# Patient Record
Sex: FEMALE | Race: OTHER | NOT HISPANIC OR LATINO | Employment: OTHER | URBAN - METROPOLITAN AREA
[De-identification: names, ages, dates, MRNs, and addresses within clinical notes are randomized per-mention and may not be internally consistent; named-entity substitution may affect disease eponyms.]

---

## 2017-01-01 ENCOUNTER — TELEPHONE (OUTPATIENT)
Dept: MEDICAL GROUP | Facility: PHYSICIAN GROUP | Age: 82
End: 2017-01-01

## 2017-01-01 ENCOUNTER — OFFICE VISIT (OUTPATIENT)
Dept: MEDICAL GROUP | Facility: PHYSICIAN GROUP | Age: 82
End: 2017-01-01
Payer: MEDICARE

## 2017-01-01 ENCOUNTER — HOSPITAL ENCOUNTER (OUTPATIENT)
Dept: RADIOLOGY | Facility: MEDICAL CENTER | Age: 82
End: 2017-11-13
Attending: FAMILY MEDICINE
Payer: MEDICARE

## 2017-01-01 ENCOUNTER — HOSPITAL ENCOUNTER (OUTPATIENT)
Dept: RADIOLOGY | Facility: MEDICAL CENTER | Age: 82
End: 2017-12-29
Attending: PHYSICIAN ASSISTANT
Payer: MEDICARE

## 2017-01-01 ENCOUNTER — OFFICE VISIT (OUTPATIENT)
Dept: URGENT CARE | Facility: PHYSICIAN GROUP | Age: 82
End: 2017-01-01
Payer: MEDICARE

## 2017-01-01 ENCOUNTER — HOSPITAL ENCOUNTER (OUTPATIENT)
Facility: MEDICAL CENTER | Age: 82
End: 2017-08-16
Payer: COMMERCIAL

## 2017-01-01 VITALS
RESPIRATION RATE: 16 BRPM | HEIGHT: 63 IN | OXYGEN SATURATION: 93 % | BODY MASS INDEX: 21.62 KG/M2 | SYSTOLIC BLOOD PRESSURE: 122 MMHG | TEMPERATURE: 98.6 F | WEIGHT: 122 LBS | DIASTOLIC BLOOD PRESSURE: 68 MMHG | HEART RATE: 96 BPM

## 2017-01-01 VITALS
HEIGHT: 63 IN | DIASTOLIC BLOOD PRESSURE: 70 MMHG | RESPIRATION RATE: 32 BRPM | OXYGEN SATURATION: 88 % | WEIGHT: 121 LBS | SYSTOLIC BLOOD PRESSURE: 136 MMHG | HEART RATE: 76 BPM | TEMPERATURE: 100 F | BODY MASS INDEX: 21.44 KG/M2

## 2017-01-01 VITALS
HEIGHT: 63 IN | OXYGEN SATURATION: 94 % | WEIGHT: 116.4 LBS | TEMPERATURE: 98 F | HEART RATE: 69 BPM | BODY MASS INDEX: 20.62 KG/M2 | SYSTOLIC BLOOD PRESSURE: 140 MMHG | DIASTOLIC BLOOD PRESSURE: 60 MMHG

## 2017-01-01 DIAGNOSIS — M53.3 TAIL BONE PAIN: ICD-10-CM

## 2017-01-01 DIAGNOSIS — R50.9 FEVER, UNSPECIFIED FEVER CAUSE: ICD-10-CM

## 2017-01-01 DIAGNOSIS — I10 ESSENTIAL HYPERTENSION: ICD-10-CM

## 2017-01-01 DIAGNOSIS — J11.1 INFLUENZA-LIKE ILLNESS: ICD-10-CM

## 2017-01-01 DIAGNOSIS — J10.1 INFLUENZA B: Primary | ICD-10-CM

## 2017-01-01 DIAGNOSIS — R05.8 COUGH PRODUCTIVE OF PURULENT SPUTUM: ICD-10-CM

## 2017-01-01 DIAGNOSIS — R20.0 NUMBNESS IN BOTH HANDS: ICD-10-CM

## 2017-01-01 DIAGNOSIS — S09.90XA CLOSED HEAD INJURY, INITIAL ENCOUNTER: ICD-10-CM

## 2017-01-01 LAB
ALBUMIN SERPL BCP-MCNC: 3.8 G/DL (ref 3.2–4.9)
ALBUMIN/GLOB SERPL: 1.2 G/DL
ALP SERPL-CCNC: 54 U/L (ref 30–99)
ALT SERPL-CCNC: 14 U/L (ref 2–50)
ANION GAP SERPL CALC-SCNC: 6 MMOL/L (ref 0–11.9)
AST SERPL-CCNC: 20 U/L (ref 12–45)
BILIRUB SERPL-MCNC: 1.4 MG/DL (ref 0.1–1.5)
BUN SERPL-MCNC: 25 MG/DL (ref 8–22)
CALCIUM SERPL-MCNC: 9.5 MG/DL (ref 8.5–10.5)
CHLORIDE SERPL-SCNC: 108 MMOL/L (ref 96–112)
CHOLEST SERPL-MCNC: 173 MG/DL (ref 100–199)
CO2 SERPL-SCNC: 25 MMOL/L (ref 20–33)
CREAT SERPL-MCNC: 1.02 MG/DL (ref 0.5–1.4)
FLUAV+FLUBV AG SPEC QL IA: NORMAL
GFR SERPL CREATININE-BSD FRML MDRD: 50 ML/MIN/1.73 M 2
GLOBULIN SER CALC-MCNC: 3.2 G/DL (ref 1.9–3.5)
GLUCOSE SERPL-MCNC: 115 MG/DL (ref 65–99)
HDLC SERPL-MCNC: 52 MG/DL
INT CON NEG: NORMAL
INT CON POS: NORMAL
LDLC SERPL CALC-MCNC: 88 MG/DL
POTASSIUM SERPL-SCNC: 4.5 MMOL/L (ref 3.6–5.5)
PROT SERPL-MCNC: 7 G/DL (ref 6–8.2)
SODIUM SERPL-SCNC: 139 MMOL/L (ref 135–145)
TRIGL SERPL-MCNC: 163 MG/DL (ref 0–149)

## 2017-01-01 PROCEDURE — 99213 OFFICE O/P EST LOW 20 MIN: CPT | Performed by: FAMILY MEDICINE

## 2017-01-01 PROCEDURE — 72220 X-RAY EXAM SACRUM TAILBONE: CPT

## 2017-01-01 PROCEDURE — 87804 INFLUENZA ASSAY W/OPTIC: CPT | Performed by: PHYSICIAN ASSISTANT

## 2017-01-01 PROCEDURE — 71020 DX-CHEST-2 VIEWS: CPT

## 2017-01-01 PROCEDURE — 99214 OFFICE O/P EST MOD 30 MIN: CPT | Performed by: PHYSICIAN ASSISTANT

## 2017-01-01 RX ORDER — LEVOFLOXACIN 500 MG/1
500 TABLET, FILM COATED ORAL DAILY
Qty: 7 TAB | Refills: 0 | Status: SHIPPED | OUTPATIENT
Start: 2017-01-01 | End: 2018-01-01

## 2017-01-01 RX ORDER — BENZONATATE 200 MG/1
200 CAPSULE ORAL 3 TIMES DAILY PRN
Qty: 60 CAP | Refills: 0 | Status: SHIPPED | OUTPATIENT
Start: 2017-01-01 | End: 2018-01-01

## 2017-01-01 RX ORDER — IBUPROFEN 200 MG
400 TABLET ORAL ONCE
Status: COMPLETED | OUTPATIENT
Start: 2017-01-01 | End: 2017-01-01

## 2017-01-01 RX ORDER — MULTIVIT WITH MINERALS/LUTEIN
1 TABLET ORAL DAILY
COMMUNITY

## 2017-01-01 RX ORDER — IBUPROFEN 200 MG
200 TABLET ORAL EVERY 6 HOURS PRN
COMMUNITY
End: 2018-01-01

## 2017-01-01 RX ORDER — OSELTAMIVIR PHOSPHATE 30 MG/1
30 CAPSULE ORAL EVERY 12 HOURS
Qty: 10 CAP | Refills: 0 | Status: SHIPPED | OUTPATIENT
Start: 2017-01-01 | End: 2018-01-01

## 2017-01-01 RX ADMIN — Medication 400 MG: at 10:10

## 2017-01-01 ASSESSMENT — ENCOUNTER SYMPTOMS
HEADACHES: 1
SHORTNESS OF BREATH: 0
HEMOPTYSIS: 0
MYALGIAS: 1
SORE THROAT: 0
SPUTUM PRODUCTION: 1
RHINORRHEA: 1
WHEEZING: 1
CHILLS: 1
COUGH: 1
SWEATS: 0
FEVER: 1

## 2017-01-01 ASSESSMENT — COPD QUESTIONNAIRES: COPD: 0

## 2017-01-01 ASSESSMENT — PATIENT HEALTH QUESTIONNAIRE - PHQ9: CLINICAL INTERPRETATION OF PHQ2 SCORE: 0

## 2017-01-29 ENCOUNTER — OFFICE VISIT (OUTPATIENT)
Dept: URGENT CARE | Facility: PHYSICIAN GROUP | Age: 82
End: 2017-01-29
Payer: MEDICARE

## 2017-01-29 VITALS
RESPIRATION RATE: 14 BRPM | WEIGHT: 123 LBS | TEMPERATURE: 98.9 F | HEIGHT: 63 IN | SYSTOLIC BLOOD PRESSURE: 118 MMHG | HEART RATE: 60 BPM | DIASTOLIC BLOOD PRESSURE: 70 MMHG | BODY MASS INDEX: 21.79 KG/M2 | OXYGEN SATURATION: 93 %

## 2017-01-29 DIAGNOSIS — J06.9 UPPER RESPIRATORY TRACT INFECTION, UNSPECIFIED TYPE: ICD-10-CM

## 2017-01-29 DIAGNOSIS — H92.01 OTALGIA, RIGHT EAR: ICD-10-CM

## 2017-01-29 PROCEDURE — G8484 FLU IMMUNIZE NO ADMIN: HCPCS | Performed by: PHYSICIAN ASSISTANT

## 2017-01-29 PROCEDURE — G8432 DEP SCR NOT DOC, RNG: HCPCS | Performed by: PHYSICIAN ASSISTANT

## 2017-01-29 PROCEDURE — G8419 CALC BMI OUT NRM PARAM NOF/U: HCPCS | Performed by: PHYSICIAN ASSISTANT

## 2017-01-29 PROCEDURE — 99203 OFFICE O/P NEW LOW 30 MIN: CPT | Performed by: PHYSICIAN ASSISTANT

## 2017-01-29 PROCEDURE — 1101F PT FALLS ASSESS-DOCD LE1/YR: CPT | Mod: 8P | Performed by: PHYSICIAN ASSISTANT

## 2017-01-29 PROCEDURE — 4004F PT TOBACCO SCREEN RCVD TLK: CPT | Mod: 8P | Performed by: PHYSICIAN ASSISTANT

## 2017-01-29 PROCEDURE — 4040F PNEUMOC VAC/ADMIN/RCVD: CPT | Mod: 8P | Performed by: PHYSICIAN ASSISTANT

## 2017-01-29 RX ORDER — LOSARTAN POTASSIUM AND HYDROCHLOROTHIAZIDE 12.5; 5 MG/1; MG/1
1 TABLET ORAL DAILY
COMMUNITY
End: 2018-01-01

## 2017-01-29 RX ORDER — AZITHROMYCIN 250 MG/1
TABLET, FILM COATED ORAL
Qty: 6 TAB | Refills: 0 | Status: SHIPPED | OUTPATIENT
Start: 2017-01-29 | End: 2017-01-01

## 2017-01-29 RX ORDER — BENZONATATE 100 MG/1
100 CAPSULE ORAL 3 TIMES DAILY PRN
Qty: 60 CAP | Refills: 0 | Status: SHIPPED | OUTPATIENT
Start: 2017-01-29 | End: 2017-01-01

## 2017-01-29 ASSESSMENT — ENCOUNTER SYMPTOMS
CHILLS: 0
SHORTNESS OF BREATH: 0
VOMITING: 0
NAUSEA: 0
ABDOMINAL PAIN: 0
FEVER: 0
MUSCULOSKELETAL NEGATIVE: 1
WHEEZING: 0
DIARRHEA: 0
SORE THROAT: 0
COUGH: 1
DIZZINESS: 0
SPUTUM PRODUCTION: 0

## 2017-01-29 ASSESSMENT — COPD QUESTIONNAIRES: COPD: 0

## 2017-01-29 NOTE — PROGRESS NOTES
"Subjective:      Ansley Vazquez is a 89 y.o. female who presents with Cough    Patient's daughter present in room acting as .        Cough  This is a new problem. The current episode started 1 to 4 weeks ago (1 week). The problem has been unchanged. The cough is non-productive. Associated symptoms include ear pain. Pertinent negatives include no chest pain, chills, ear congestion, fever, nasal congestion, sore throat, shortness of breath or wheezing. Nothing aggravates the symptoms. She has tried OTC cough suppressant for the symptoms. The treatment provided mild relief. There is no history of asthma, COPD or pneumonia.   Patient reports her chest hurts when she coughs. PMH includes HTN for the past 10 years, well controlled with Hyzaar.      Review of Systems   Constitutional: Negative for fever and chills.   HENT: Positive for ear pain. Negative for sore throat.    Respiratory: Positive for cough. Negative for sputum production, shortness of breath and wheezing.    Cardiovascular: Negative for chest pain.   Gastrointestinal: Negative for nausea, vomiting, abdominal pain and diarrhea.   Genitourinary: Negative.    Musculoskeletal: Negative.    Neurological: Negative for dizziness.          Objective:     /70 mmHg  Pulse 60  Temp(Src) 37.2 °C (98.9 °F)  Resp 14  Ht 1.6 m (5' 3\")  Wt 55.792 kg (123 lb)  BMI 21.79 kg/m2  SpO2 93%     Physical Exam   Constitutional: She is oriented to person, place, and time. She appears well-developed and well-nourished. No distress.   HENT:   Head: Normocephalic and atraumatic.   Right Ear: Hearing, tympanic membrane, external ear and ear canal normal.   Left Ear: Hearing, tympanic membrane, external ear and ear canal normal.   Nose: Nose normal.   Mouth/Throat: Oropharynx is clear and moist. No oropharyngeal exudate.   Eyes: Conjunctivae are normal. Pupils are equal, round, and reactive to light. Right eye exhibits no discharge. Left eye exhibits no discharge. "   Neck: Normal range of motion.   Cardiovascular: Normal rate, regular rhythm and normal heart sounds.    No murmur heard.  Pulmonary/Chest: Effort normal and breath sounds normal. No respiratory distress. She has no wheezes. She has no rales.   Musculoskeletal: Normal range of motion.   Lymphadenopathy:     She has no cervical adenopathy.   Neurological: She is alert and oriented to person, place, and time.   Skin: Skin is warm and dry. She is not diaphoretic.   Psychiatric: She has a normal mood and affect. Her behavior is normal.   Nursing note and vitals reviewed.         PMH:  has no past medical history on file.  MEDS:   Current outpatient prescriptions:   •  losartan-hydrochlorothiazide (HYZAAR) 50-12.5 MG per tablet, Take 1 Tab by mouth every day., Disp: , Rfl:   •  azithromycin (ZITHROMAX) 250 MG Tab, Take as directed, Disp: 6 Tab, Rfl: 0  •  benzonatate (TESSALON) 100 MG Cap, Take 1 Cap by mouth 3 times a day as needed for Cough., Disp: 60 Cap, Rfl: 0  ALLERGIES:   Allergies   Allergen Reactions   • Aspirin Unspecified     Bloody stools   • Penicillins Hives   • Voltaren [Diclofenac]      SURGHX: History reviewed. No pertinent past surgical history.  SOCHX:    FH: family history is not on file.       Assessment/Plan:     1. Upper respiratory tract infection, unspecified type  - azithromycin (ZITHROMAX) 250 MG Tab; Take as directed  Dispense: 6 Tab; Refill: 0   - Complete full course of antibiotics as prescribed   - benzonatate (TESSALON) 100 MG Cap; Take 1 Cap by mouth 3 times a day as needed for Cough.  Dispense: 60 Cap; Refill: 0    2. Otalgia, right ear  - No evidence of bacterial infection  - Recommend OTC ibuprofen as needed for ear pain  - Call or return to office if symptoms persist or worsen

## 2017-01-29 NOTE — MR AVS SNAPSHOT
"        Ansley SHAYNA George   2017 10:15 AM   Office Visit   MRN: [id number]    Department:  [facility]   Dept Phone:  [phone]    Description:  Female : 3/11/1920   Provider:  Rissa Lobo PA-C           Reason for Visit     Cough cough, R ear pain x1 wk      Allergies as of 2017     Allergen Noted Reactions    Aspirin 2017   Unspecified    Bloody stools    Penicillins 2017   Hives    Voltaren [Diclofenac] 2017         You were diagnosed with     Upper respiratory tract infection, unspecified type   [[id number]]         Vital Signs     Blood Pressure Pulse Temperature Respirations Height Weight    118/70 mmHg 60 37.2 °C (98.9 °F) 14 1.6 m (5' 3\") 55.792 kg (123 lb)    Body Mass Index Oxygen Saturation                21.79 kg/m2 93%          Basic Information     Date Of Birth Sex Race Ethnicity Preferred Language    3/11/1920 Female Other Non- Other      Health Maintenance     Patient has no pending health maintenance at this time      Current Immunizations     No immunizations on file.      Below and/or attached are the medications your provider expects you to take. Review all of your home medications and newly ordered medications with your provider and/or pharmacist. Follow medication instructions as directed by your provider and/or pharmacist. Please keep your medication list with you and share with your provider. Update the information when medications are discontinued, doses are changed, or new medications (including over-the-counter products) are added; and carry medication information at all times in the event of emergency situations     Allergies:  ASPIRIN - Unspecified     PENICILLINS - Hives     VOLTAREN - (reactions not documented)               Medications  Valid as of: 2017 - 10:44 AM    Generic Name Brand Name Tablet Size Instructions for use    Azithromycin (Tab) ZITHROMAX 250 MG Take as directed        Benzonatate (Cap) TESSALON 100 MG Take 1 " Cap by mouth 3 times a day as needed for Cough.        Losartan Potassium-HCTZ (Tab) HYZAAR 50-12.5 MG Take 1 Tab by mouth every day.        .                 Medicines prescribed today were sent to:     [facility]/PHARMACY #[id number] - [place] - [address]    [address] [place] [address]    Phone: [phone] Fax: [phone]    Open 24 Hours?: No      Medication refill instructions:       If your prescription bottle indicates you have medication refills left, it is not necessary to call your provider’s office. Please contact your pharmacy and they will refill your medication.    If your prescription bottle indicates you do not have any refills left, you may request refills at any time through one of the following ways: The online eRALOS3 system (except Urgent Care), by calling your provider’s office, or by asking your pharmacy to contact your provider’s office with a refill request. Medication refills are processed only during regular business hours and may not be available until the next business day. Your provider may request additional information or to have a follow-up visit with you prior to refilling your medication.   *Please Note: Medication refills are assigned a new Rx number when refilled electronically. Your pharmacy may indicate that no refills were authorized even though a new prescription for the same medication is available at the pharmacy. Please request the medicine by name with the pharmacy before contacting your provider for a refill.           eRALOS3 Access Code: [email or web address]  Expires: 2/28/2017 10:44 AM    Your email address is not on file at [facility].  Email Addresses are required for you to sign up for eRALOS3, please contact [phone] to verify your personal information and to provide your email address prior to attempting to register for eRALOS3.    [place]  [address]   [place] [address]    eRALOS3  A secure, online tool to manage  your health information     [facility] UpTo’s CogniCor Technologiest® is a secure, online tool that connects you to your personalized health information from the privacy of your home -- day or night - making it very easy for you to manage your healthcare. Once the activation process is completed, you can even access your medical information using the CogniCor Technologiest keena, which is available for free in the Apple Keena store or Google Play store.     To learn more about [email or web address], visit [email or web address]    There are two levels of access available (as shown below):   My Chart Features  [facility] Primary Care Doctor [facility]  Specialists [facility]  Urgent  Care Non-[facility] Primary Care Doctor   Email your healthcare team securely and privately 24/7 X X X    Manage appointments: schedule your next appointment; view details of past/upcoming appointments X      Request prescription refills. X      View recent personal medical records, including lab and immunizations X X X X   View health record, including health history, allergies, medications X X X X   Read reports about your outpatient visits, procedures, consult and ER notes X X X X   See your discharge summary, which is a recap of your hospital and/or ER visit that includes your diagnosis, lab results, and care plan X X  X     How to register for [email or web address]:  Once your e-mail address has been verified, follow the following steps to sign up for [email or web address].     1. Go to  [email or web address]  2. Click on the Sign Up Now box, which takes you to the New Member Sign Up page. You will need to provide the following information:  a. Enter your [email or web address].net Access Code exactly as it appears at the top of this page. (You will not need to use this code after you’ve completed the sign-up process. If you do not sign up before the expiration date, you must request a new code.)   b. Enter your date of birth.   c. Enter your home email address.   d. Click Submit, and follow the next screen’s instructions.  3. Create a  MyChart ID. This will be your MapMyFitnesst login ID and cannot be changed, so think of one that is secure and easy to remember.  4. Create a Scripted password. You can change your password at any time.  5. Enter your Password Reset Question and Answer. This can be used at a later time if you forget your password.   6. Enter your e-mail address. This allows you to receive e-mail notifications when new information is available in Scripted.  7. Click Sign Up. You can now view your health information.    For assistance activating your Scripted account, call [phone]

## 2017-03-17 ENCOUNTER — OFFICE VISIT (OUTPATIENT)
Dept: URGENT CARE | Facility: PHYSICIAN GROUP | Age: 82
End: 2017-03-17
Payer: MEDICARE

## 2017-03-17 VITALS
RESPIRATION RATE: 16 BRPM | OXYGEN SATURATION: 85 % | WEIGHT: 110 LBS | SYSTOLIC BLOOD PRESSURE: 160 MMHG | DIASTOLIC BLOOD PRESSURE: 86 MMHG | HEART RATE: 80 BPM | BODY MASS INDEX: 19.49 KG/M2 | TEMPERATURE: 100 F

## 2017-03-17 DIAGNOSIS — J01.10 ACUTE NON-RECURRENT FRONTAL SINUSITIS: Primary | ICD-10-CM

## 2017-03-17 DIAGNOSIS — R50.9 FEVER, UNSPECIFIED FEVER CAUSE: ICD-10-CM

## 2017-03-17 PROCEDURE — 1101F PT FALLS ASSESS-DOCD LE1/YR: CPT | Mod: 8P | Performed by: PHYSICIAN ASSISTANT

## 2017-03-17 PROCEDURE — G8432 DEP SCR NOT DOC, RNG: HCPCS | Performed by: PHYSICIAN ASSISTANT

## 2017-03-17 PROCEDURE — G8484 FLU IMMUNIZE NO ADMIN: HCPCS | Performed by: PHYSICIAN ASSISTANT

## 2017-03-17 PROCEDURE — 99214 OFFICE O/P EST MOD 30 MIN: CPT | Performed by: PHYSICIAN ASSISTANT

## 2017-03-17 PROCEDURE — 4004F PT TOBACCO SCREEN RCVD TLK: CPT | Mod: 8P | Performed by: PHYSICIAN ASSISTANT

## 2017-03-17 PROCEDURE — 4040F PNEUMOC VAC/ADMIN/RCVD: CPT | Mod: 8P | Performed by: PHYSICIAN ASSISTANT

## 2017-03-17 PROCEDURE — G8419 CALC BMI OUT NRM PARAM NOF/U: HCPCS | Performed by: PHYSICIAN ASSISTANT

## 2017-03-17 RX ORDER — CYCLOSPORINE 0.5 MG/ML
EMULSION OPHTHALMIC
Refills: 11 | COMMUNITY
Start: 2016-12-24

## 2017-03-17 RX ORDER — SULFAMETHOXAZOLE AND TRIMETHOPRIM 400; 80 MG/1; MG/1
1 TABLET ORAL 2 TIMES DAILY
Qty: 20 TAB | Refills: 0 | Status: SHIPPED | OUTPATIENT
Start: 2017-03-17 | End: 2017-03-27

## 2017-03-17 ASSESSMENT — ENCOUNTER SYMPTOMS
COUGH: 1
WHEEZING: 0
GASTROINTESTINAL NEGATIVE: 1
CHILLS: 0
FEVER: 0
NERVOUS/ANXIOUS: 1
SHORTNESS OF BREATH: 0
DIZZINESS: 1
EYES NEGATIVE: 1
MUSCULOSKELETAL NEGATIVE: 1
HEADACHES: 1
CARDIOVASCULAR NEGATIVE: 1

## 2017-03-17 NOTE — MR AVS SNAPSHOT
Ansley Vazquez   3/17/2017 6:00 PM   Office Visit   MRN: [id number]    Department:  [facility]   Dept Phone:  [phone]    Description:  Female : 3/11/1920   Provider:  Jarett Ortiz PA-C           Reason for Visit     Dizziness dizziness for about an hour, had cough x1 wk ago, sinus issues/pressure      Allergies as of 3/17/2017     Allergen Noted Reactions    Aspirin 2017   Unspecified    Bloody stools    Penicillins 2017   Hives    Voltaren [Diclofenac] 2017         You were diagnosed with     Fever, unspecified fever cause   [[id number]]       Sinus pressure   [[id number]]         Vital Signs     Blood Pressure Pulse Temperature Respirations Weight Oxygen Saturation    160/86 mmHg 80 37.8 °C (100 °F) 16 49.896 kg (110 lb) 85%      Basic Information     Date Of Birth Sex Race Ethnicity Preferred Language    3/11/1920 Female Other Non- English      Health Maintenance        Date Due Completion Dates    IMM DTaP/Tdap/Td Vaccine (1 - Tdap) 3/11/1939 ---    PAP SMEAR 3/11/1941 ---    MAMMOGRAM 3/11/1960 ---    COLONOSCOPY 3/11/1970 ---    IMM ZOSTER VACCINE 3/11/1980 ---    IMM PNEUMOCOCCAL 65+ (ADULT) LOW/MEDIUM RISK SERIES (1 of 2 - PCV13) 3/11/1985 ---    BONE DENSITY 2016    IMM INFLUENZA (1) 2016 ---            Current Immunizations     No immunizations on file.      Below and/or attached are the medications your provider expects you to take. Review all of your home medications and newly ordered medications with your provider and/or pharmacist. Follow medication instructions as directed by your provider and/or pharmacist. Please keep your medication list with you and share with your provider. Update the information when medications are discontinued, doses are changed, or new medications (including over-the-counter products) are added; and carry medication information at all times in the event of emergency situations     Allergies:  ASPIRIN -  Unspecified     PENICILLINS - Hives     VOLTAREN - (reactions not documented)               Medications  Valid as of: March 17, 2017 -  7:06 PM    Generic Name Brand Name Tablet Size Instructions for use    Azithromycin (Tab) ZITHROMAX 250 MG Take as directed        Benzonatate (Cap) TESSALON 100 MG Take 1 Cap by mouth 3 times a day as needed for Cough.        CycloSPORINE (Emulsion) RESTASIS 0.05 % INSTILL 1 DROP INTO BOTH EYES TWICE A DAY        Losartan Potassium-HCTZ (Tab) HYZAAR 50-12.5 MG Take 1 Tab by mouth every day.        Sulfamethoxazole-Trimethoprim (Tab) BACTRIM 400-80 MG Take 1 Tab by mouth 2 times a day for 10 days.        .                 Medicines prescribed today were sent to:     [facility]/PHARMACY #[id number] - [place], [place] - [address] [place] [address] [place] [address]    Phone: [phone] Fax: [phone]    Open 24 Hours?: No      Medication refill instructions:       If your prescription bottle indicates you have medication refills left, it is not necessary to call your provider’s office. Please contact your pharmacy and they will refill your medication.    If your prescription bottle indicates you do not have any refills left, you may request refills at any time through one of the following ways: The online etrigg system (except Urgent Care), by calling your provider’s office, or by asking your pharmacy to contact your provider’s office with a refill request. Medication refills are processed only during regular business hours and may not be available until the next business day. Your provider may request additional information or to have a follow-up visit with you prior to refilling your medication.   *Please Note: Medication refills are assigned a new Rx number when refilled electronically. Your pharmacy may indicate that no refills were authorized even though a new prescription for the same medication is available at the pharmacy. Please request the medicine  by name with the pharmacy before contacting your provider for a refill.        Your To Do List     Future Labs/Procedures Complete By Expires    URINE CULTURE(NEW)  As directed 3/17/2018      Instructions    Nasal spray: Ponaris.    Humidifier at bedtime.  Hot steam showers to loosen up mucous.  Lots of fluids, tea with honey.  Ibuprofen for headache, fever, chills.  Be sure to take with food.  Return if worsening: Yellow thicker mucus changes, worsening pain around the eyes and radiates to the teeth, and fever over 101°F.      Follow-up with PCP.          Lucinda Access Code: [email or web address]  Expires: 4/16/2017  7:06 PM    Your email address is not on file at [facility].  Email Addresses are required for you to sign up for Julep, please contact [phone] to verify your personal information and to provide your email address prior to attempting to register for Julep.    Ansley V[place]  [address]   [place] [address]    Julep  A secure, online tool to manage your health information     [facility]’s Julep® is a secure, online tool that connects you to your personalized health information from the privacy of your home -- day or night - making it very easy for you to manage your healthcare. Once the activation process is completed, you can even access your medical information using the Julep keena, which is available for free in the Apple Keena store or Google Play store.     To learn more about Julep, visit [email or web address]    There are two levels of access available (as shown below):   My Chart Features  [facility] Primary Care Doctor [facility]  Specialists [facility]  Urgent  Care Non-[facility] Primary Care Doctor   Email your healthcare team securely and privately 24/7 X X X    Manage appointments: schedule your next appointment; view details of past/upcoming appointments X      Request prescription refills. X      View recent personal medical records, including lab and immunizations X X X X   View health  record, including health history, allergies, medications X X X X   Read reports about your outpatient visits, procedures, consult and ER notes X X X X   See your discharge summary, which is a recap of your hospital and/or ER visit that includes your diagnosis, lab results, and care plan X X  X     How to register for Ground Up Biosolutions:  Once your e-mail address has been verified, follow the following steps to sign up for Ground Up Biosolutions.     1. Go to  [email or web address]  2. Click on the Sign Up Now box, which takes you to the New Member Sign Up page. You will need to provide the following information:  a. Enter your Ground Up Biosolutions Access Code exactly as it appears at the top of this page. (You will not need to use this code after you’ve completed the sign-up process. If you do not sign up before the expiration date, you must request a new code.)   b. Enter your date of birth.   c. Enter your home email address.   d. Click Submit, and follow the next screen’s instructions.  3. Create a Ground Up Biosolutions ID. This will be your [employer] Up [employer]olutions login ID and cannot be changed, so think of one that is secure and easy to remember.  4. Create a [employer]olutions password. You can change your password at any time.  5. Enter your Password Reset Question and Answer. This can be used at a later time if you forget your password.   6. Enter your e-mail address. This allows you to receive e-mail notifications when new information is available in Ground Up Biosolutions.  7. Click Sign Up. You can now view your health information.    For assistance activating your Ground Up Biosolutions account, call [phone]

## 2017-03-18 ENCOUNTER — HOSPITAL ENCOUNTER (OUTPATIENT)
Facility: MEDICAL CENTER | Age: 82
End: 2017-03-18
Attending: PHYSICIAN ASSISTANT
Payer: MEDICARE

## 2017-03-18 DIAGNOSIS — R50.9 FEVER, UNSPECIFIED FEVER CAUSE: ICD-10-CM

## 2017-03-18 PROCEDURE — 87086 URINE CULTURE/COLONY COUNT: CPT

## 2017-03-18 NOTE — PROGRESS NOTES
Subjective:      Ansley Vazquez is a 89 y.o. female who presents with Dizziness            Dizziness  Associated symptoms include congestion, coughing and headaches. Pertinent negatives include no chills or fever.     Chief Complaint   Patient presents with   • Dizziness     dizziness for about an hour, had cough x1 wk ago, sinus issues/pressure       HPI:  Ansley Vazquez is a 89 y.o. female who presents with daughter and cousin.  Dizziness started with standing today.  She does have a propensity to get nervous. After the dizziness happened and she is putting the telephone back into its cradle she Started having shakiness in the hands.  She became very nervous and had some chest pressure. No further pressure thereafter. For the past week she has had a nonproductive cough and sinus congestion. Left side of the nose is plugged.  Cough has improved. She has noticed more yellow productive thick or mucus from the nose.Nosorethroat.    Was treated for URI and ear infection late January and also symptoms resolved thereafter.      No past medical history on file.    No past surgical history on file.    No family history on file.    Social History     Social History   • Marital Status:      Spouse Name: N/A   • Number of Children: N/A   • Years of Education: N/A     Occupational History   • Not on file.     Social History Main Topics   • Smoking status: Not on file   • Smokeless tobacco: Not on file   • Alcohol Use: Not on file   • Drug Use: Not on file   • Sexual Activity: Not on file     Other Topics Concern   • Not on file     Social History Narrative   • No narrative on file         Current outpatient prescriptions:   •  RESTASIS, INSTILL 1 DROP INTO BOTH EYES TWICE A DAY  •  losartan-hydrochlorothiazide, 1 Tab, Oral, DAILY, 1/29/2017  •  azithromycin, Take as directed  •  benzonatate, 100 mg, Oral, TID PRN    Allergies   Allergen Reactions   • Aspirin Unspecified     Bloody stools   • Penicillins Hives   •  Voltaren [Diclofenac]             Review of Systems   Constitutional: Negative for fever and chills.   HENT: Positive for congestion. Negative for ear pain.    Eyes: Negative.    Respiratory: Positive for cough. Negative for shortness of breath and wheezing.    Cardiovascular: Negative.    Gastrointestinal: Negative.    Genitourinary: Negative.    Musculoskeletal: Negative.    Skin: Negative.    Neurological: Positive for dizziness and headaches.   Endo/Heme/Allergies: Negative.    Psychiatric/Behavioral: The patient is nervous/anxious.           Objective:     /86 mmHg  Pulse 80  Temp(Src) 37.8 °C (100 °F)  Resp 16  Wt 49.896 kg (110 lb)  SpO2 85%     Physical Exam       Physical Exam   Nursing note and vitals reviewed.    Constitutional:   Appropriately groomed, pleasant affect, well nourished, in NAD.    Head:   Normocephalic, atraumatic.    Eyes:   PERRLA, EOM's full, sclera white, conjunctiva not erythematous, and medial canthus without exudate bilaterally.    Ears:  Auricle and tragus non-tender to manipulation.  No pre-auricular lymphadenopathy or mastoid ttp.  EACs with mild cerumen bilaterally, not erythematous.  TM’s pearly gray with cone of light present and umbo and malleolus visible bilaterally.  No bulging or fluid bubbles present in middle ear.  Hearing grossly intact to voice.    Nose:  Nares not patent bilaterally.  Nasal mucosa edematous with yellow-white rhinorrhea bilaterally. Frontal sinuses tender to percussion bilaterally.    Throat:  Dentition wnl, mucosa moist without lesions.  Oropharynx erythematous, with no enlargement of the palatine tonsils bilaterally with no exudates.    Post nasal drainage present.  Soft palate rises symmetrically bilaterally and uvula midline.      Neck: Neck supple, with mild anterior lymphadenopathy that is soft and mobile to palpation. Thyroid non-palpable without tenderness or nodules. No supraclavicular lymphadenopathy.    Lungs:  Respiratory effort  not labored without accessory muscle use.  Lungs clear to auscultation bilaterally without wheezes or rales. Rhonchi clear to cough.    Heart:  RRR, without murmurs rubs or gallops.  Radial and dorsalis pedis pulse 2+ bilaterally.  No LE edema.    Musculoskeletal:  Gait non-antalgic with a narrow base.    Derm:  Skin without rashes or lesions with good turgor pressure.      Psychiatric:  Mood, affect, and judgement appropriate.         Assessment/Plan:     1. Fever, unspecified fever cause    - sulfamethoxazole-trimethoprim (BACTRIM) 400-80 MG Tab; Take 1 Tab by mouth 2 times a day for 10 days.  Dispense: 20 Tab; Refill: 0  - URINE CULTURE(NEW); Future    2. Acute non-recurrent frontal sinusitis  Patient presents with one week of upper respiratory infection with cough. She has noticed more yellow purulent nasal discharge with left nasal passageway congestion. Dizziness started today when she was standing and she developed shakiness thereafter. She denies any popping or clicking in the ears. No fever or chills. She does live alone. She became very nervous after the dizziness happened which seems to be induced mostly with looking down upward. Cannot rule out BPPV. Per cousin, vertigo does run in her family and both of her 2 sisters have experienced transitory vertigo at times.    On exam patient has yellow rhinorrhea bilaterally with overall dry nasal passageways. Lungs clear to auscultation bilaterally. No evidence of otitis media. Patient with elevated temperature and oxygen saturation 85% room air. After sitting for 20 minutes, repeat pulse ox improved to 94% room air.  UA with trace blood. Sent urine for culture for further evaluation. Advised the patient to increase fluids and plan to treat with low-dose Bactrim ×10 days cover both bacterial sinusitis and urinary tract infection. Did advise patient to follow with PCP next week or back at urgent care if not improving.    Patient was in agreement with this  treatment plan and seemed to understand without barriers. All questions were encouraged and answered.  Reviewed signs and symptoms of when to seek emergency medical care.     Please note that this dictation was created using voice recognition software.  I have made every reasonable attempt to correct obvious errors, but I expect there are errors of chio and possibly content that I did not discover before finalizing the note.     - sulfamethoxazole-trimethoprim (BACTRIM) 400-80 MG Tab; Take 1 Tab by mouth 2 times a day for 10 days.  Dispense: 20 Tab; Refill: 0

## 2017-03-18 NOTE — PATIENT INSTRUCTIONS
Nasal spray: Ponaris.    Humidifier at bedtime.  Hot steam showers to loosen up mucous.  Lots of fluids, tea with honey.  Ibuprofen for headache, fever, chills.  Be sure to take with food.  Return if worsening: Yellow thicker mucus changes, worsening pain around the eyes and radiates to the teeth, and fever over 101°F.      Follow-up with PCP.

## 2017-03-20 LAB
BACTERIA UR CULT: NORMAL
SIGNIFICANT IND 70042: NORMAL
SOURCE SOURCE: NORMAL

## 2017-04-08 ENCOUNTER — HOSPITAL ENCOUNTER (OUTPATIENT)
Dept: LAB | Facility: MEDICAL CENTER | Age: 82
End: 2017-04-08
Attending: INTERNAL MEDICINE
Payer: MEDICARE

## 2017-04-08 LAB
ALBUMIN SERPL BCP-MCNC: 4.2 G/DL (ref 3.2–4.9)
ALBUMIN/GLOB SERPL: 1.2 G/DL
ALP SERPL-CCNC: 63 U/L (ref 30–99)
ALT SERPL-CCNC: 19 U/L (ref 2–50)
ANION GAP SERPL CALC-SCNC: 5 MMOL/L (ref 0–11.9)
AST SERPL-CCNC: 22 U/L (ref 12–45)
BILIRUB SERPL-MCNC: 0.5 MG/DL (ref 0.1–1.5)
BUN SERPL-MCNC: 32 MG/DL (ref 8–22)
CALCIUM SERPL-MCNC: 9.4 MG/DL (ref 8.5–10.5)
CHLORIDE SERPL-SCNC: 105 MMOL/L (ref 96–112)
CO2 SERPL-SCNC: 28 MMOL/L (ref 20–33)
CREAT SERPL-MCNC: 1.13 MG/DL (ref 0.5–1.4)
ERYTHROCYTE [DISTWIDTH] IN BLOOD BY AUTOMATED COUNT: 46.1 FL (ref 35.9–50)
GFR SERPL CREATININE-BSD FRML MDRD: 45 ML/MIN/1.73 M 2
GLOBULIN SER CALC-MCNC: 3.4 G/DL (ref 1.9–3.5)
GLUCOSE SERPL-MCNC: 126 MG/DL (ref 65–99)
HCT VFR BLD AUTO: 40 % (ref 37–47)
HGB BLD-MCNC: 12.6 G/DL (ref 12–16)
MCH RBC QN AUTO: 31.4 PG (ref 27–33)
MCHC RBC AUTO-ENTMCNC: 31.5 G/DL (ref 33.6–35)
MCV RBC AUTO: 99.8 FL (ref 81.4–97.8)
PLATELET # BLD AUTO: 155 K/UL (ref 164–446)
PMV BLD AUTO: 10.5 FL (ref 9–12.9)
POTASSIUM SERPL-SCNC: 4.9 MMOL/L (ref 3.6–5.5)
PROT SERPL-MCNC: 7.6 G/DL (ref 6–8.2)
RBC # BLD AUTO: 4.01 M/UL (ref 4.2–5.4)
SODIUM SERPL-SCNC: 138 MMOL/L (ref 135–145)
WBC # BLD AUTO: 4.1 K/UL (ref 4.8–10.8)

## 2017-04-08 PROCEDURE — 85027 COMPLETE CBC AUTOMATED: CPT

## 2017-04-08 PROCEDURE — 80053 COMPREHEN METABOLIC PANEL: CPT

## 2017-04-08 PROCEDURE — 36415 COLL VENOUS BLD VENIPUNCTURE: CPT

## 2017-04-20 ENCOUNTER — HOSPITAL ENCOUNTER (OUTPATIENT)
Dept: LAB | Facility: MEDICAL CENTER | Age: 82
End: 2017-04-20
Attending: OPHTHALMOLOGY
Payer: MEDICARE

## 2017-04-20 LAB
ALBUMIN SERPL BCP-MCNC: 4.4 G/DL (ref 3.2–4.9)
ALBUMIN/GLOB SERPL: 1.3 G/DL
ALP SERPL-CCNC: 66 U/L (ref 30–99)
ALT SERPL-CCNC: 18 U/L (ref 2–50)
ANION GAP SERPL CALC-SCNC: 4 MMOL/L (ref 0–11.9)
AST SERPL-CCNC: 22 U/L (ref 12–45)
BASOPHILS # BLD AUTO: 1 % (ref 0–1.8)
BASOPHILS # BLD: 0.05 K/UL (ref 0–0.12)
BILIRUB SERPL-MCNC: 0.5 MG/DL (ref 0.1–1.5)
BUN SERPL-MCNC: 43 MG/DL (ref 8–22)
CALCIUM SERPL-MCNC: 9.8 MG/DL (ref 8.5–10.5)
CHLORIDE SERPL-SCNC: 101 MMOL/L (ref 96–112)
CO2 SERPL-SCNC: 29 MMOL/L (ref 20–33)
CREAT SERPL-MCNC: 1.45 MG/DL (ref 0.5–1.4)
CRP SERPL HS-MCNC: 0.07 MG/DL (ref 0–0.75)
EOSINOPHIL # BLD AUTO: 0.09 K/UL (ref 0–0.51)
EOSINOPHIL NFR BLD: 1.8 % (ref 0–6.9)
ERYTHROCYTE [DISTWIDTH] IN BLOOD BY AUTOMATED COUNT: 45.7 FL (ref 35.9–50)
ERYTHROCYTE [SEDIMENTATION RATE] IN BLOOD BY WESTERGREN METHOD: 21 MM/HOUR (ref 0–30)
GFR SERPL CREATININE-BSD FRML MDRD: 33 ML/MIN/1.73 M 2
GLOBULIN SER CALC-MCNC: 3.5 G/DL (ref 1.9–3.5)
GLUCOSE SERPL-MCNC: 96 MG/DL (ref 65–99)
HCT VFR BLD AUTO: 39.4 % (ref 37–47)
HGB BLD-MCNC: 12.5 G/DL (ref 12–16)
IMM GRANULOCYTES # BLD AUTO: 0.02 K/UL (ref 0–0.11)
IMM GRANULOCYTES NFR BLD AUTO: 0.4 % (ref 0–0.9)
LYMPHOCYTES # BLD AUTO: 2.62 K/UL (ref 1–4.8)
LYMPHOCYTES NFR BLD: 51.3 % (ref 22–41)
MCH RBC QN AUTO: 30.9 PG (ref 27–33)
MCHC RBC AUTO-ENTMCNC: 31.7 G/DL (ref 33.6–35)
MCV RBC AUTO: 97.5 FL (ref 81.4–97.8)
MONOCYTES # BLD AUTO: 0.35 K/UL (ref 0–0.85)
MONOCYTES NFR BLD AUTO: 6.8 % (ref 0–13.4)
NEUTROPHILS # BLD AUTO: 1.98 K/UL (ref 2–7.15)
NEUTROPHILS NFR BLD: 38.7 % (ref 44–72)
NRBC # BLD AUTO: 0 K/UL
NRBC BLD AUTO-RTO: 0 /100 WBC
PLATELET # BLD AUTO: 146 K/UL (ref 164–446)
PMV BLD AUTO: 9.4 FL (ref 9–12.9)
POTASSIUM SERPL-SCNC: 4.5 MMOL/L (ref 3.6–5.5)
PROT SERPL-MCNC: 7.9 G/DL (ref 6–8.2)
RBC # BLD AUTO: 4.04 M/UL (ref 4.2–5.4)
SODIUM SERPL-SCNC: 134 MMOL/L (ref 135–145)
WBC # BLD AUTO: 5.1 K/UL (ref 4.8–10.8)

## 2017-04-20 PROCEDURE — 86140 C-REACTIVE PROTEIN: CPT

## 2017-04-20 PROCEDURE — 36415 COLL VENOUS BLD VENIPUNCTURE: CPT

## 2017-04-20 PROCEDURE — 85652 RBC SED RATE AUTOMATED: CPT

## 2017-04-20 PROCEDURE — 85025 COMPLETE CBC W/AUTO DIFF WBC: CPT

## 2017-04-20 PROCEDURE — 80053 COMPREHEN METABOLIC PANEL: CPT

## 2017-05-09 ENCOUNTER — HOSPITAL ENCOUNTER (OUTPATIENT)
Dept: RADIOLOGY | Facility: MEDICAL CENTER | Age: 82
End: 2017-05-09
Attending: OPHTHALMOLOGY
Payer: MEDICARE

## 2017-05-09 DIAGNOSIS — R51.9 FACIAL PAIN: ICD-10-CM

## 2017-05-09 DIAGNOSIS — H57.10: ICD-10-CM

## 2017-05-09 PROCEDURE — 93880 EXTRACRANIAL BILAT STUDY: CPT

## 2017-05-12 ENCOUNTER — HOSPITAL ENCOUNTER (OUTPATIENT)
Dept: RADIOLOGY | Facility: MEDICAL CENTER | Age: 82
End: 2017-05-12
Attending: OPHTHALMOLOGY
Payer: MEDICARE

## 2017-05-12 DIAGNOSIS — M31.6 TEMPORAL ARTERITIS (HCC): ICD-10-CM

## 2017-05-12 PROCEDURE — 93880 EXTRACRANIAL BILAT STUDY: CPT

## 2017-07-24 NOTE — Clinical Note
[facility]  Vy Singh M.D.  [address]  [place] [address]  Fax: [phone]   Authorization for Release/Disclosure of   Protected Health Information   Name: ANSLEY DOMINGO : 3/11/1920 SSN: [id number]   Address: [address] [address] Phone:    [phone] (home)    I authorize the entity listed below to release/disclose the PHI below to:   [facility]/Vy Singh M.D. and Vy Singh M.D.   Provider or Entity Name:    Dr. Asher [facility]   Phone:  [phone]    Fax:  [phone]   Reason for request: continuity of care   Information to be released:    [  ] LAST COLONOSCOPY,  including any PATH REPORT and follow-up  [  ] LAST FIT/COLOGUARD RESULT [  ] LAST DEXA  [  ] LAST MAMMOGRAM  [  ] LAST PAP  [  ] LAST LABS [  ] RETINA EXAM REPORT  [  ] IMMUNIZATION RECORDS  [  ] Release all info      [  ] Check here and initial the line next to each item to release ALL health information INCLUDING  _____ Care and treatment for drug and / or alcohol abuse  _____ HIV testing, infection status, or AIDS  _____ Genetic Testing    DATES OF SERVICE OR TIME PERIOD TO BE DISCLOSED: _____________  I understand and acknowledge that:  * This Authorization may be revoked at any time by you in writing, except if your health information has already been used or disclosed.  * Your health information that will be used or disclosed as a result of you signing this authorization could be re-disclosed by the recipient. If this occurs, your re-disclosed health information may no longer be protected by State or Federal laws.  * You may refuse to sign this Authorization. Your refusal will not affect your ability to obtain treatment.  * This Authorization becomes effective upon signing and will  on (date) __________.      If no date is indicated, this Authorization will  one (1) year from the signature date.    Name: Ansley  Angelina Vazquez    Signature:   Date:     7/24/2017       PLEASE FAX REQUESTED RECORDS BACK TO: [phone]

## 2017-07-25 NOTE — PROGRESS NOTES
Subjective:      Ansley Vazquez is a 89 y.o. female who presents with [facility] and Other            Other      This is a 89-year-old [place]one[place] female who is here as a new patient establish. She is accompanied by her daughter who is a regular patient of mine. Patient was previously under the care of Dr. Asher.    Patient has hypertension which is under control on losartan/HCTZ. She has no history of diabetes or dyslipidemia.    She has been having some numbness of her hands when she wakes up in the morning. Sometimes the numbness awakens her in the middle of the night.     She lives with her daughter. She is still independent and sharp. She is still able to cook and clean the house.    I reviewed the following    Past Medical History   Diagnosis Date   • HTN (hypertension)         Past Surgical History   Procedure Laterality Date   • Arthroscopy, knee Bilateral    • Cataract extraction with iol Bilateral    • Salpingectomy       right    • Appendectomy         Allergies   Allergen Reactions   • Aspirin Unspecified     Bloody stools   • Penicillins Hives   • Voltaren [Diclofenac]        Current Outpatient Prescriptions   Medication Sig Dispense Refill   • vitamin E (VITAMIN E) 1000 UNIT Cap Take 1 Cap by mouth every day.     • vitamin D (CHOLECALCIFEROL) 1000 UNIT Tab Take 1,000 Units by mouth every day.     • Calcium Citrate-Vitamin D (CALCIUM + D PO) Take  by mouth.     • Multiple Vitamin (MULTI VITAMIN DAILY PO) Take  by mouth.     • losartan-hydrochlorothiazide (HYZAAR) 50-12.5 MG per tablet Take 1 Tab by mouth every day.     • RESTASIS 0.05 % ophthalmic emulsion INSTILL 1 DROP INTO BOTH EYES TWICE A DAY  11   • azithromycin (ZITHROMAX) 250 MG Tab Take as directed 6 Tab 0   • benzonatate (TESSALON) 100 MG Cap Take 1 Cap by mouth 3 times a day as needed for Cough. 60 Cap 0     No current facility-administered medications for this visit.        History reviewed. No pertinent family  "history.    Social History     Social History   • Marital Status:      Spouse Name: N/A   • Number of Children: N/A   • Years of Education: N/A     Occupational History   • Not on file.     Social History Main Topics   • Smoking status: Never Smoker    • Smokeless tobacco: Never Used   • Alcohol Use: No   • Drug Use: No   • Sexual Activity:     Partners: Male     Other Topics Concern   • Not on file     Social History Narrative          ROS     Review of Systems  Constitutional: Negative for fever, chills, weight loss and malaise/fatigue.   HEENT: Negative for ear pain, nosebleeds, congestion, sore throat and neck pain.    Eyes: Negative for blurred vision.   Respiratory: Negative for cough, sputum production, shortness of breath and wheezing.    Cardiovascular: Negative for chest pain, palpitations, orthopnea and leg swelling.   Gastrointestinal: Negative for heartburn, nausea, vomiting and abdominal pain.   Genitourinary: Negative for dysuria, urgency and frequency.   Musculoskeletal: Negative for myalgias, back pain and joint pain.   Skin: Negative for rash and itching.   Neurological: Negative for dizziness, tingling, tremors, sensory change, focal weakness and headaches. Positive numbness of the hands   Endo/Heme/Allergies: Does not bruise/bleed easily.   Psychiatric/Behavioral: Negative for depression, anxiety, or memory loss.     All other systems reviewed and are negative except as in HPI.         Objective:     /68 mmHg  Pulse 96  Temp(Src) 37 °C (98.6 °F)  Resp 16  Ht 1.6 m (5' 3\")  Wt 55.339 kg (122 lb)  BMI 21.62 kg/m2  SpO2 93%     Physical Exam   Constitutional: She is oriented to person, place, and time. She appears well-developed and well-nourished. No distress.   HENT:   Head: Normocephalic and atraumatic.   Right Ear: External ear normal.   Left Ear: External ear normal.   Nose: Nose normal.   Mouth/Throat: Oropharynx is clear and moist. No oropharyngeal exudate.   Eyes: " Conjunctivae and EOM are normal. Pupils are equal, round, and reactive to light. Right eye exhibits no discharge. Left eye exhibits no discharge. No scleral icterus.   Neck: Normal range of motion. Neck supple. No JVD present. No tracheal deviation present. No thyromegaly present.   Cardiovascular: Normal rate, regular rhythm and intact distal pulses.  Exam reveals no gallop and no friction rub.    No murmur heard.  Pulmonary/Chest: Effort normal and breath sounds normal. No stridor. No respiratory distress. She has no wheezes. She has no rales. She exhibits no tenderness.   Abdominal: Soft. Bowel sounds are normal. She exhibits no distension and no mass. There is no tenderness. There is no rebound and no guarding. No hernia.   Musculoskeletal: Normal range of motion. She exhibits no edema, tenderness or deformity.   Lymphadenopathy:     She has no cervical adenopathy.   Neurological: She is alert and oriented to person, place, and time. She has normal reflexes. She displays normal reflexes. No cranial nerve deficit. She exhibits normal muscle tone. Coordination normal.   Strong  both hands, no atrophy of the muscles of the hands, she has some arthritic changes of the PIP and DIP joints, intact neurovascular status, negative Tinel's and Phalen's   Skin: Skin is warm and dry. No rash noted. She is not diaphoretic. No erythema. No pallor.   Psychiatric: She has a normal mood and affect. Her behavior is normal. Judgment and thought content normal.   Nursing note and vitals reviewed.                 Assessment/Plan:     1. Essential hypertension  Controlled on her medication. I will get records from previous physician.    2. Numbness in both hands  This could be positional when she is sleeping. Negative Tinel's or Phalen's. We will try carpal tunnel splints for both hands to be worn when sleeping at night to see this would improve her symptoms.    I will follow her up in 6 months. We will get records from previous  physician. We will update her immunizations after we get her records.      Please note that this dictation was created using voice recognition software. I have worked with consultants from the vendor as well as technical experts from [employer] [facility] to optimize the interface. I have made every reasonable attempt to correct obvious errors, but I expect that there are errors of grammar and possibly content I did not discover before finalizing the note.

## 2017-11-11 NOTE — TELEPHONE ENCOUNTER
ESTABLISHED PATIENT PRE-VISIT PLANNING     Note: Patient will not be contacted if there is no indication to call.     1.  Reviewed notes from the last few office visits within the medical group: Yes    2.  If any orders were placed at last visit or intended to be done for this visit (i.e. 6 mos follow-up), do we have Results/Consult Notes?        •  Labs - Labs were not ordered at last office visit.   Note: If patient appointment is for lab review and patient did not complete labs, check with provider if OK to reschedule patient until labs completed.       •  Imaging - Imaging was not ordered at last office visit.       •  Referrals - No referrals were ordered at last office visit.    3. Is this appointment scheduled as a Hospital Follow-Up? No    4.  Immunizations were updated in Epic using WebIZ?: No WebIZ record       •  Web Iz Recommendations: UNKNOWN    5.  Patient is due for the following Health Maintenance Topics:   Health Maintenance Due   Topic Date Due   • Annual Wellness Visit  03/11/1920   • IMM DTaP/Tdap/Td Vaccine (1 - Tdap) 03/11/1939   • PAP SMEAR  03/11/1941   • MAMMOGRAM  03/11/1960   • COLONOSCOPY  03/11/1970   • IMM ZOSTER VACCINE  03/11/1980   • IMM PNEUMOCOCCAL 65+ (ADULT) LOW/MEDIUM RISK SERIES (1 of 2 - PCV13) 03/11/1985   • BONE DENSITY  02/11/2016   • IMM INFLUENZA (1) 09/01/2017       - Patient already has appointment scheduled for Immunizations: FLU, PREVNAR (PCV13) , TDAP and ZOSTAVAX (Shingles).Please ask Pt about vaccinations at appointment,      6.  Patient was NOT informed to arrive 15 min prior to their scheduled appointment and bring in their medication bottles.

## 2017-11-13 NOTE — PROGRESS NOTES
"Subjective:      Ansley Vazquez is a 89 y.o. female who presents with Pain (Tail bone) and Nevus            HPI     Patient is here brought by her daughter because she sustained a fall a week ago. She was trying to sit on the couch and she missed and fell on carpeted floor. Since then she has been having pain in her tailbone and coccyx. She denies any pain going down the legs. She denies any numbness or tingling down the legs. She denies any problems with urination or bowel movement. She has been taking over-the-counter Advil as needed with help. She has been putting ice on the area.     About 10 days ago she had her head on to the refrigerator. She said initially there was swelling of the top of the head on the left side. She has not had any problems with her mentation, balance. She said she always has headache which has been there even before the injury.    Past medical history, past surgical history, family history reviewed-no changes    Social history reviewed-no changes    Allergies reviewed-no changes    Medications reviewed-no changes    ROS     Review of systems as per history of present illness, the rest are negative.       Objective:     /60   Pulse 69   Temp 36.7 °C (98 °F)   Ht 1.6 m (5' 3\")   Wt 52.8 kg (116 lb 6.5 oz)   SpO2 94%   BMI 20.62 kg/m²      Physical Exam     Examined alert, awake, oriented, not in distress    Head-no swelling or hematoma in the scalp, no open sores or wounds, no tenderness in the area where she hit her head left parietal area, eyes-positive PERRLA, EOMs intact, no facial droop, tongue midline,  Neck-supple, no lymphadenopathy, no thyromegaly  Lungs-clear to auscultation, no rales, no wheezes  Heart-regular rate and rhythm, no murmur  Extremities-no edema, clubbing, cyanosis  Back exam-no pinpoint tenderness sacrum and coccyx, no hematoma noted in the area of the pain, no pinpoint tenderness spinous processes of the lumbar spine, no spasms of the " paravertebral muscles  Neuro exam-Motor strength 5/5 upper and lower extremities, sensation intact to light touch, negative knee-jerk reflexes due to previous knee replacement bilaterally, +2+ DTRs upper extremities, negative Nilay            Assessment/Plan:     1. Tail bone pain  We will get x-ray of the sacrum and coccyx. Advised warm compresses to the area. Change to Tylenol which is better tolerated without risk of gastritis, peptic ulcer disease especially in her advanced age. May use doughnut hole cushion/Lifesaver to sit on to avoid pressure on the elbow and/coccyx   - DX-SACRUM AND COCCYX 2+; Future    2. Closed head injury, initial encounter  No neurologic deficit. Conservative measures with Tylenol as needed for pain and warm compresses.      Please note that this dictation was created using voice recognition software. I have worked with consultants from the vendor as well as technical experts from [facility] to optimize the interface. I have made every reasonable attempt to correct obvious errors, but I expect that there are errors of grammar and possibly content I did not discover before finalizing the note.

## 2017-11-13 NOTE — TELEPHONE ENCOUNTER
VOICEMAIL  1. Caller Name: Ansley Vazquez                        Call Back Number: [phone] (home)       2. Message: Called and left patient a message to call back to get lab results. LM     3. Patient approves office to leave a detailed voicemail/MyChart message: N\A

## 2017-11-13 NOTE — TELEPHONE ENCOUNTER
----- Message from Vy Singh M.D. sent at 11/13/2017 12:49 PM PST -----  X-ray did not show any fracture of the tailbone. Continue with warm compresses and try to use doughnut hole cushion/Lifesaver when sitting on avoid pressure on the tailbone.

## 2017-12-29 NOTE — PROGRESS NOTES
Subjective:      Ansley Vazquez is a 89 y.o. female who presents with Cough (congestion, wheezing at night x 3 days)    PMH:  has a past medical history of HTN (hypertension).  MEDS:   Current Outpatient Prescriptions:   •  ibuprofen (MOTRIN) 200 MG Tab, Take 200 mg by mouth every 6 hours as needed., Disp: , Rfl:   •  oseltamivir (TAMIFLU) 30 MG Cap, Take 1 Cap by mouth every 12 hours for 5 days., Disp: 10 Cap, Rfl: 0  •  levofloxacin (LEVAQUIN) 500 MG tablet, Take 1 Tab by mouth every day., Disp: 7 Tab, Rfl: 0  •  benzonatate (TESSALON) 200 MG capsule, Take 1 Cap by mouth 3 times a day as needed for Cough., Disp: 60 Cap, Rfl: 0  •  vitamin E (VITAMIN E) 1000 UNIT Cap, Take 1 Cap by mouth every day., Disp: , Rfl:   •  vitamin D (CHOLECALCIFEROL) 1000 UNIT Tab, Take 1,000 Units by mouth every day., Disp: , Rfl:   •  Calcium Citrate-Vitamin D (CALCIUM + D PO), Take  by mouth., Disp: , Rfl:   •  Multiple Vitamin (MULTI VITAMIN DAILY PO), Take  by mouth., Disp: , Rfl:   •  RESTASIS 0.05 % ophthalmic emulsion, INSTILL 1 DROP INTO BOTH EYES TWICE A DAY, Disp: , Rfl: 11  •  losartan-hydrochlorothiazide (HYZAAR) 50-12.5 MG per tablet, Take 1 Tab by mouth every day., Disp: , Rfl:     Current Facility-Administered Medications:   •  ibuprofen (MOTRIN) tablet 400 mg, 400 mg, Oral, Once, Otilia Hay, P.A.-C.  ALLERGIES:   Allergies   Allergen Reactions   • Aspirin Unspecified     Bloody stools   • Penicillins Hives   • Voltaren [Diclofenac]      SURGHX:   Past Surgical History:   Procedure Laterality Date   • APPENDECTOMY     • ARTHROSCOPY, KNEE Bilateral    • CATARACT EXTRACTION WITH IOL Bilateral    • SALPINGECTOMY      right      SOCHX:  reports that she has never smoked. She has never used smokeless tobacco. She reports that she does not drink alcohol or use drugs.  FH: Reviewed with patient/family. Not pertinent to this complaint.          Patient brought into clinic today by her daughter for evaluation of  "fever and cough ×48 hours. Patient states she has had fever chills body aches, productive cough, but denies nausea vomiting or diarrhea.  Patient states she gets poor sleep due to the cough.    Patient did not get a flu shot, she had a severe allergic reaction to the flu shot in the past, and has been counseled not to receive any more.    Patient has been taking Advil for her fever with some improvement, but no change in cough.    Patient denies any other complaint.      Cough   This is a new problem. The current episode started in the past 7 days. The problem has been gradually worsening. The problem occurs every few minutes. The cough is productive of sputum. Associated symptoms include chills, a fever, headaches, myalgias, rhinorrhea and wheezing (when lying down). Pertinent negatives include no ear pain, hemoptysis, rash, sore throat, shortness of breath or sweats. The symptoms are aggravated by lying down (exertion). She has tried body position changes, OTC cough suppressant and rest for the symptoms. The treatment provided no relief. There is no history of asthma, COPD, emphysema or pneumonia.       Review of Systems   Constitutional: Positive for chills and fever.   HENT: Positive for congestion and rhinorrhea. Negative for ear pain and sore throat.    Respiratory: Positive for cough, sputum production and wheezing (when lying down). Negative for hemoptysis and shortness of breath.    Musculoskeletal: Positive for myalgias.   Skin: Negative for rash.   Neurological: Positive for headaches.   All other systems reviewed and are negative.         Objective:     /70   Pulse 76   Temp 37.8 °C (100 °F)   Resp (!) 32   Ht 1.6 m (5' 3\")   Wt 54.9 kg (121 lb)   SpO2 88%   BMI 21.43 kg/m²      Physical Exam   Constitutional: She is oriented to person, place, and time. She appears well-developed and well-nourished. She appears ill. No distress.   HENT:   Head: Normocephalic and atraumatic.   Mouth/Throat: " Oropharynx is clear and moist.   Eyes: Conjunctivae and EOM are normal. Pupils are equal, round, and reactive to light.   Neck: Normal range of motion. Neck supple.   Cardiovascular: Normal rate, regular rhythm and normal heart sounds.    Pulmonary/Chest: Effort normal. No respiratory distress. She has rhonchi. She has no rales.   Abdominal: Soft.   Musculoskeletal: Normal range of motion.   Lymphadenopathy:     She has no cervical adenopathy.   Neurological: She is alert and oriented to person, place, and time.   Skin: Skin is warm and dry. Capillary refill takes less than 2 seconds.   Psychiatric: She has a normal mood and affect.   Nursing note and vitals reviewed.         Flu: positive B    Xray images viewed and interpreted by me, confirmed by radiology:    Impression       1.  There is mild perihilar and lower lobe interstitial opacity likely related to viral pneumonitis. There is no focal airspace process.   Reading Provider Reading Date   Argelia Bowers M.D. Dec 29, 2017   Signing Provider Signing Date Signing Time   Argelia Bowers M.D. Dec 29, 2017 10:53 AM          Assessment/Plan:     1. Influenza B  ibuprofen (MOTRIN) 200 MG Tab    POCT Influenza A/B    DX-CHEST-2 VIEWS    ibuprofen (MOTRIN) tablet 400 mg    oseltamivir (TAMIFLU) 30 MG Cap    levofloxacin (LEVAQUIN) 500 MG tablet    benzonatate (TESSALON) 200 MG capsule   2. Cough productive of purulent sputum  ibuprofen (MOTRIN) 200 MG Tab    POCT Influenza A/B    DX-CHEST-2 VIEWS    ibuprofen (MOTRIN) tablet 400 mg    oseltamivir (TAMIFLU) 30 MG Cap    levofloxacin (LEVAQUIN) 500 MG tablet    benzonatate (TESSALON) 200 MG capsule   3. Fever, unspecified fever cause  ibuprofen (MOTRIN) 200 MG Tab    POCT Influenza A/B    DX-CHEST-2 VIEWS    ibuprofen (MOTRIN) tablet 400 mg    oseltamivir (TAMIFLU) 30 MG Cap    levofloxacin (LEVAQUIN) 500 MG tablet    benzonatate (TESSALON) 200 MG capsule       Will treat with tamiflu at a dose corrected for her  Creatinine Cl.   Rx for Levaquin based on CXR.      PT can continue OTC medications, increase fluids and rest until symptoms improve.     PT should follow up with PCP in 1-2 days for re-evaluation if symptoms have not improved.  Discussed red flags and reasons to return to UC or ED.  Pt and/or family verbalized understanding of diagnosis and follow up instructions and was offered informational handout on diagnosis.  PT discharged.

## 2017-12-29 NOTE — PATIENT INSTRUCTIONS
"Influenza Facts  Flu (influenza) is a contagious respiratory illness caused by the influenza viruses. It can cause mild to severe illness. While most healthy people recover from the flu without specific treatment and without complications, older people, young children, and people with certain health conditions are at higher risk for serious complications from the flu, including death.  CAUSES   · The flu virus is spread from person to person by respiratory droplets from coughing and sneezing.   · A person can also become infected by touching an object or surface with a virus on it and then touching their mouth, eye or nose.   · Adults may be able to infect others from 1 day before symptoms occur and up to 7 days after getting sick. So it is possible to give someone the flu even before you know you are sick and continue to infect others while you are sick.   SYMPTOMS   · Fever (usually high).   · Headache.   · Tiredness (can be extreme).   · Cough.   · Sore throat.   · Runny or stuffy nose.   · Body aches.   · Diarrhea and vomiting may also occur, particularly in children.   · These symptoms are referred to as \"flu-like symptoms\". A lot of different illnesses, including the common cold, can have similar symptoms.   DIAGNOSIS   · There are tests that can determine if you have the flu as long you are tested within the first 2 or 3 days of illness.   · A doctor's exam and additional tests may be needed to identify if you have a disease that is a complicating the flu.   RISKS AND COMPLICATIONS   Some of the complications caused by the flu include:  · Bacterial pneumonia or progressive pneumonia caused by the flu virus.   · Loss of body fluids (dehydration).   · Worsening of chronic medical conditions, such as heart failure, asthma, or diabetes.   · Sinus problems and ear infections.   HOME CARE INSTRUCTIONS   · Seek medical care early on.   · If you are at high risk from complications of the flu, consult your health-care " provider as soon as you develop flu-like symptoms. Those at high risk for complications include:   · People 65 years or older.   · People with chronic medical conditions, including diabetes.   · Pregnant women.   · Young children.   · Your caregiver may recommend use of an antiviral medication to help treat the flu.   · If you get the flu, get plenty of rest, drink a lot of liquids, and avoid using alcohol and tobacco.   · You can take over-the-counter medications to relieve the symptoms of the flu if your caregiver approves. (Never give aspirin to children or teenagers who have flu-like symptoms, particularly fever).   PREVENTION   The single best way to prevent the flu is to get a flu vaccine each fall. Other measures that can help protect against the flu are:  · Antiviral Medications   · A number of antiviral drugs are approved for use in preventing the flu. These are prescription medications, and a doctor should be consulted before they are used.   · Habits for Good Health   · Cover your nose and mouth with a tissue when you cough or sneeze, throw the tissue away after you use it.   · Wash your hands often with soap and water, especially after you cough or sneeze. If you are not near water, use an alcohol-based hand .   · Avoid people who are sick.   · If you get the flu, stay home from work or school. Avoid contact with other people so that you do not make them sick, too.   · Try not to touch your eyes, nose, or mouth as germs ore often spread this way.   IN CHILDREN, EMERGENCY WARNING SIGNS THAT NEED URGENT MEDICAL ATTENTION:  · Fast breathing or trouble breathing.   · Bluish skin color.   · Not drinking enough fluids.   · Not waking up or not interacting.   · Being so irritable that the child does not want to be held.   · Flu-like symptoms improve but then return with fever and worse cough.   · Fever with a rash.   IN ADULTS, EMERGENCY WARNING SIGNS THAT NEED URGENT MEDICAL ATTENTION:  · Difficulty  breathing or shortness of breath.   · Pain or pressure in the chest or abdomen.   · Sudden dizziness.   · Confusion.   · Severe or persistent vomiting.   SEEK IMMEDIATE MEDICAL CARE IF:   You or someone you know is experiencing any of the symptoms above. When you arrive at the emergency center,report that you think you have the flu. You may be asked to wear a mask and/or sit in a secluded area to protect others from getting sick.  MAKE SURE YOU:   · Understand these instructions.   · Monitor your condition.   · Seek medical care if you are getting worse, or not improving.   Document Released: 12/20/2004 Document Revised: 03/11/2013 Document Reviewed: 09/16/2010  [employer]® Patient Information ©2013 [employer].

## 2018-01-01 ENCOUNTER — HOME CARE VISIT (OUTPATIENT)
Dept: HOME HEALTH SERVICES | Facility: HOME HEALTHCARE | Age: 83
End: 2018-01-01
Payer: MEDICARE

## 2018-01-01 ENCOUNTER — OFFICE VISIT (OUTPATIENT)
Dept: MEDICAL GROUP | Facility: PHYSICIAN GROUP | Age: 83
End: 2018-01-01
Payer: MEDICARE

## 2018-01-01 ENCOUNTER — HOME CARE VISIT (OUTPATIENT)
Dept: HOSPICE | Facility: HOSPICE | Age: 83
End: 2018-01-01
Payer: MEDICARE

## 2018-01-01 ENCOUNTER — APPOINTMENT (OUTPATIENT)
Dept: RADIOLOGY | Facility: MEDICAL CENTER | Age: 83
DRG: 638 | End: 2018-01-01
Attending: HOSPITALIST
Payer: MEDICARE

## 2018-01-01 ENCOUNTER — TELEPHONE (OUTPATIENT)
Dept: MEDICAL GROUP | Facility: PHYSICIAN GROUP | Age: 83
End: 2018-01-01

## 2018-01-01 ENCOUNTER — PATIENT OUTREACH (OUTPATIENT)
Dept: HEALTH INFORMATION MANAGEMENT | Facility: OTHER | Age: 83
End: 2018-01-01

## 2018-01-01 ENCOUNTER — HOME CARE VISIT (OUTPATIENT)
Dept: HOME HEALTH SERVICES | Facility: HOME HEALTHCARE | Age: 83
End: 2018-01-01

## 2018-01-01 ENCOUNTER — HOSPITAL ENCOUNTER (INPATIENT)
Facility: MEDICAL CENTER | Age: 83
LOS: 4 days | DRG: 638 | End: 2018-04-21
Attending: EMERGENCY MEDICINE | Admitting: INTERNAL MEDICINE
Payer: MEDICARE

## 2018-01-01 ENCOUNTER — HOSPICE ADMISSION (OUTPATIENT)
Dept: HOSPICE | Facility: HOSPICE | Age: 83
End: 2018-01-01
Payer: MEDICARE

## 2018-01-01 ENCOUNTER — HOSPITAL ENCOUNTER (INPATIENT)
Facility: MEDICAL CENTER | Age: 83
LOS: 3 days | DRG: 436 | End: 2018-06-07
Attending: EMERGENCY MEDICINE | Admitting: HOSPITALIST
Payer: MEDICARE

## 2018-01-01 ENCOUNTER — HOSPITAL ENCOUNTER (OUTPATIENT)
Dept: RADIOLOGY | Facility: MEDICAL CENTER | Age: 83
End: 2018-04-03
Attending: FAMILY MEDICINE
Payer: MEDICARE

## 2018-01-01 ENCOUNTER — HOSPITAL ENCOUNTER (OUTPATIENT)
Dept: LAB | Facility: MEDICAL CENTER | Age: 83
End: 2018-04-03
Attending: FAMILY MEDICINE
Payer: MEDICARE

## 2018-01-01 ENCOUNTER — HOME HEALTH ADMISSION (OUTPATIENT)
Dept: HOME HEALTH SERVICES | Facility: HOME HEALTHCARE | Age: 83
End: 2018-01-01
Payer: MEDICARE

## 2018-01-01 ENCOUNTER — HOSPITAL ENCOUNTER (OUTPATIENT)
Dept: LAB | Facility: MEDICAL CENTER | Age: 83
End: 2018-05-24
Attending: FAMILY MEDICINE
Payer: MEDICARE

## 2018-01-01 ENCOUNTER — RESOLUTE PROFESSIONAL BILLING HOSPITAL PROF FEE (OUTPATIENT)
Dept: HOSPITALIST | Facility: MEDICAL CENTER | Age: 83
End: 2018-01-01
Payer: MEDICARE

## 2018-01-01 ENCOUNTER — HOSPITAL ENCOUNTER (OUTPATIENT)
Dept: LAB | Facility: MEDICAL CENTER | Age: 83
End: 2018-04-17
Attending: FAMILY MEDICINE
Payer: MEDICARE

## 2018-01-01 ENCOUNTER — APPOINTMENT (OUTPATIENT)
Dept: HOSPICE | Facility: HOSPICE | Age: 83
End: 2018-01-01
Payer: MEDICARE

## 2018-01-01 ENCOUNTER — TELEPHONE (OUTPATIENT)
Dept: MEDICAL GROUP | Facility: LAB | Age: 83
End: 2018-01-01

## 2018-01-01 ENCOUNTER — HOSPITAL ENCOUNTER (OUTPATIENT)
Dept: RADIOLOGY | Facility: MEDICAL CENTER | Age: 83
End: 2018-05-23
Attending: FAMILY MEDICINE
Payer: MEDICARE

## 2018-01-01 ENCOUNTER — TELEPHONE (OUTPATIENT)
Dept: HEALTH INFORMATION MANAGEMENT | Facility: OTHER | Age: 83
End: 2018-01-01

## 2018-01-01 ENCOUNTER — HOSPITAL ENCOUNTER (OUTPATIENT)
Dept: LAB | Facility: MEDICAL CENTER | Age: 83
End: 2018-01-31
Attending: FAMILY MEDICINE
Payer: MEDICARE

## 2018-01-01 ENCOUNTER — HOSPITAL ENCOUNTER (OUTPATIENT)
Dept: RADIOLOGY | Facility: MEDICAL CENTER | Age: 83
End: 2018-04-24
Attending: FAMILY MEDICINE
Payer: MEDICARE

## 2018-01-01 ENCOUNTER — APPOINTMENT (OUTPATIENT)
Dept: MEDICAL GROUP | Facility: PHYSICIAN GROUP | Age: 83
End: 2018-01-01
Payer: MEDICARE

## 2018-01-01 VITALS
BODY MASS INDEX: 18.91 KG/M2 | HEART RATE: 78 BPM | HEIGHT: 62 IN | TEMPERATURE: 97.9 F | DIASTOLIC BLOOD PRESSURE: 50 MMHG | OXYGEN SATURATION: 97 % | WEIGHT: 102.73 LBS | SYSTOLIC BLOOD PRESSURE: 110 MMHG

## 2018-01-01 VITALS
TEMPERATURE: 99 F | SYSTOLIC BLOOD PRESSURE: 100 MMHG | RESPIRATION RATE: 20 BRPM | OXYGEN SATURATION: 99 % | HEART RATE: 81 BPM | DIASTOLIC BLOOD PRESSURE: 70 MMHG

## 2018-01-01 VITALS
RESPIRATION RATE: 18 BRPM | DIASTOLIC BLOOD PRESSURE: 70 MMHG | OXYGEN SATURATION: 94 % | TEMPERATURE: 96.8 F | HEART RATE: 74 BPM | SYSTOLIC BLOOD PRESSURE: 110 MMHG

## 2018-01-01 VITALS
OXYGEN SATURATION: 96 % | RESPIRATION RATE: 18 BRPM | TEMPERATURE: 98.4 F | HEART RATE: 82 BPM | SYSTOLIC BLOOD PRESSURE: 101 MMHG | DIASTOLIC BLOOD PRESSURE: 51 MMHG | HEIGHT: 62 IN | BODY MASS INDEX: 19.35 KG/M2 | WEIGHT: 105.16 LBS

## 2018-01-01 VITALS
OXYGEN SATURATION: 92 % | WEIGHT: 103 LBS | BODY MASS INDEX: 18.95 KG/M2 | TEMPERATURE: 97.8 F | RESPIRATION RATE: 14 BRPM | HEIGHT: 62 IN | SYSTOLIC BLOOD PRESSURE: 140 MMHG | DIASTOLIC BLOOD PRESSURE: 76 MMHG | HEART RATE: 76 BPM

## 2018-01-01 VITALS
TEMPERATURE: 99 F | OXYGEN SATURATION: 92 % | SYSTOLIC BLOOD PRESSURE: 100 MMHG | RESPIRATION RATE: 15 BRPM | HEART RATE: 74 BPM | DIASTOLIC BLOOD PRESSURE: 62 MMHG

## 2018-01-01 VITALS
TEMPERATURE: 99 F | DIASTOLIC BLOOD PRESSURE: 60 MMHG | SYSTOLIC BLOOD PRESSURE: 90 MMHG | OXYGEN SATURATION: 93 % | RESPIRATION RATE: 18 BRPM | HEART RATE: 83 BPM

## 2018-01-01 VITALS
SYSTOLIC BLOOD PRESSURE: 138 MMHG | OXYGEN SATURATION: 91 % | WEIGHT: 108.13 LBS | BODY MASS INDEX: 19.16 KG/M2 | TEMPERATURE: 98.2 F | HEIGHT: 63 IN | RESPIRATION RATE: 12 BRPM | DIASTOLIC BLOOD PRESSURE: 78 MMHG | HEART RATE: 70 BPM

## 2018-01-01 VITALS
SYSTOLIC BLOOD PRESSURE: 110 MMHG | DIASTOLIC BLOOD PRESSURE: 60 MMHG | TEMPERATURE: 98.1 F | HEART RATE: 70 BPM | BODY MASS INDEX: 19.73 KG/M2 | HEIGHT: 63 IN | WEIGHT: 111.33 LBS | OXYGEN SATURATION: 92 %

## 2018-01-01 VITALS
DIASTOLIC BLOOD PRESSURE: 72 MMHG | SYSTOLIC BLOOD PRESSURE: 160 MMHG | TEMPERATURE: 98.5 F | WEIGHT: 117.6 LBS | HEART RATE: 66 BPM | BODY MASS INDEX: 20.84 KG/M2 | HEIGHT: 63 IN | OXYGEN SATURATION: 92 % | RESPIRATION RATE: 20 BRPM

## 2018-01-01 VITALS
SYSTOLIC BLOOD PRESSURE: 110 MMHG | TEMPERATURE: 98.8 F | RESPIRATION RATE: 18 BRPM | DIASTOLIC BLOOD PRESSURE: 70 MMHG | HEART RATE: 80 BPM | OXYGEN SATURATION: 96 %

## 2018-01-01 VITALS
OXYGEN SATURATION: 98 % | TEMPERATURE: 99.6 F | RESPIRATION RATE: 18 BRPM | DIASTOLIC BLOOD PRESSURE: 80 MMHG | HEART RATE: 79 BPM | WEIGHT: 100.4 LBS | SYSTOLIC BLOOD PRESSURE: 120 MMHG | BODY MASS INDEX: 18.36 KG/M2

## 2018-01-01 VITALS
BODY MASS INDEX: 20.82 KG/M2 | WEIGHT: 117.5 LBS | OXYGEN SATURATION: 93 % | HEIGHT: 63 IN | HEART RATE: 69 BPM | SYSTOLIC BLOOD PRESSURE: 130 MMHG | DIASTOLIC BLOOD PRESSURE: 70 MMHG | TEMPERATURE: 97.8 F

## 2018-01-01 VITALS — SYSTOLIC BLOOD PRESSURE: 110 MMHG | DIASTOLIC BLOOD PRESSURE: 67 MMHG | RESPIRATION RATE: 12 BRPM

## 2018-01-01 VITALS
DIASTOLIC BLOOD PRESSURE: 60 MMHG | RESPIRATION RATE: 16 BRPM | SYSTOLIC BLOOD PRESSURE: 100 MMHG | OXYGEN SATURATION: 96 % | HEART RATE: 72 BPM | TEMPERATURE: 98.2 F

## 2018-01-01 VITALS
OXYGEN SATURATION: 91 % | SYSTOLIC BLOOD PRESSURE: 118 MMHG | RESPIRATION RATE: 12 BRPM | HEART RATE: 74 BPM | DIASTOLIC BLOOD PRESSURE: 58 MMHG | TEMPERATURE: 97.9 F

## 2018-01-01 VITALS
SYSTOLIC BLOOD PRESSURE: 122 MMHG | WEIGHT: 107.58 LBS | BODY MASS INDEX: 19.06 KG/M2 | RESPIRATION RATE: 20 BRPM | TEMPERATURE: 98.3 F | HEIGHT: 63 IN | HEART RATE: 86 BPM | DIASTOLIC BLOOD PRESSURE: 55 MMHG | OXYGEN SATURATION: 91 %

## 2018-01-01 VITALS
TEMPERATURE: 98 F | HEART RATE: 71 BPM | DIASTOLIC BLOOD PRESSURE: 70 MMHG | RESPIRATION RATE: 18 BRPM | OXYGEN SATURATION: 98 % | SYSTOLIC BLOOD PRESSURE: 100 MMHG

## 2018-01-01 VITALS
HEART RATE: 69 BPM | DIASTOLIC BLOOD PRESSURE: 80 MMHG | BODY MASS INDEX: 20.12 KG/M2 | OXYGEN SATURATION: 93 % | WEIGHT: 113.54 LBS | HEIGHT: 63 IN | SYSTOLIC BLOOD PRESSURE: 160 MMHG | TEMPERATURE: 97.6 F

## 2018-01-01 VITALS
HEART RATE: 70 BPM | SYSTOLIC BLOOD PRESSURE: 100 MMHG | TEMPERATURE: 99.2 F | WEIGHT: 104 LBS | OXYGEN SATURATION: 96 % | RESPIRATION RATE: 18 BRPM | DIASTOLIC BLOOD PRESSURE: 60 MMHG | BODY MASS INDEX: 19.02 KG/M2

## 2018-01-01 VITALS
OXYGEN SATURATION: 91 % | SYSTOLIC BLOOD PRESSURE: 110 MMHG | HEART RATE: 68 BPM | DIASTOLIC BLOOD PRESSURE: 68 MMHG | TEMPERATURE: 98.1 F | RESPIRATION RATE: 12 BRPM

## 2018-01-01 VITALS
OXYGEN SATURATION: 96 % | SYSTOLIC BLOOD PRESSURE: 100 MMHG | HEART RATE: 69 BPM | DIASTOLIC BLOOD PRESSURE: 60 MMHG | TEMPERATURE: 98.2 F

## 2018-01-01 VITALS
DIASTOLIC BLOOD PRESSURE: 70 MMHG | TEMPERATURE: 97.2 F | BODY MASS INDEX: 20.35 KG/M2 | HEIGHT: 63 IN | HEART RATE: 75 BPM | WEIGHT: 114.86 LBS | OXYGEN SATURATION: 93 % | SYSTOLIC BLOOD PRESSURE: 150 MMHG

## 2018-01-01 VITALS — RESPIRATION RATE: 16 BRPM | TEMPERATURE: 97.5 F | OXYGEN SATURATION: 94 % | HEART RATE: 78 BPM

## 2018-01-01 DIAGNOSIS — E55.9 VITAMIN D DEFICIENCY: ICD-10-CM

## 2018-01-01 DIAGNOSIS — R73.9 HYPERGLYCEMIA: ICD-10-CM

## 2018-01-01 DIAGNOSIS — R53.83 FATIGUE, UNSPECIFIED TYPE: ICD-10-CM

## 2018-01-01 DIAGNOSIS — R51.9 FRONTAL HEADACHE: ICD-10-CM

## 2018-01-01 DIAGNOSIS — K86.89 MASS OF PANCREAS: ICD-10-CM

## 2018-01-01 DIAGNOSIS — E11.9 DIABETES MELLITUS, NEW ONSET (HCC): ICD-10-CM

## 2018-01-01 DIAGNOSIS — R53.81 DEBILITY: ICD-10-CM

## 2018-01-01 DIAGNOSIS — R05.9 COUGH: ICD-10-CM

## 2018-01-01 DIAGNOSIS — I10 ESSENTIAL HYPERTENSION: ICD-10-CM

## 2018-01-01 DIAGNOSIS — R17 JAUNDICE: ICD-10-CM

## 2018-01-01 DIAGNOSIS — K86.89 PANCREATIC DUCT DILATED: ICD-10-CM

## 2018-01-01 DIAGNOSIS — R42 DIZZINESS: ICD-10-CM

## 2018-01-01 DIAGNOSIS — N17.9 ACUTE KIDNEY INJURY (HCC): ICD-10-CM

## 2018-01-01 DIAGNOSIS — K86.89 PANCREATIC MASS: ICD-10-CM

## 2018-01-01 DIAGNOSIS — M75.42 IMPINGEMENT SYNDROME OF BOTH SHOULDERS: ICD-10-CM

## 2018-01-01 DIAGNOSIS — M75.42 IMPINGEMENT SYNDROME OF LEFT SHOULDER: ICD-10-CM

## 2018-01-01 DIAGNOSIS — M75.41 IMPINGEMENT SYNDROME OF SHOULDER, RIGHT: ICD-10-CM

## 2018-01-01 DIAGNOSIS — K86.89 DILATED PANCREATIC DUCT: ICD-10-CM

## 2018-01-01 DIAGNOSIS — R07.9 CHEST PAIN, UNSPECIFIED TYPE: ICD-10-CM

## 2018-01-01 DIAGNOSIS — M75.41 IMPINGEMENT SYNDROME OF BOTH SHOULDERS: ICD-10-CM

## 2018-01-01 DIAGNOSIS — R53.81 PHYSICAL DECONDITIONING: ICD-10-CM

## 2018-01-01 DIAGNOSIS — R53.1 GENERALIZED WEAKNESS: ICD-10-CM

## 2018-01-01 DIAGNOSIS — J40 BRONCHITIS: ICD-10-CM

## 2018-01-01 DIAGNOSIS — J20.9 ACUTE BRONCHITIS, UNSPECIFIED ORGANISM: ICD-10-CM

## 2018-01-01 DIAGNOSIS — K83.8 DILATED BILE DUCT: ICD-10-CM

## 2018-01-01 DIAGNOSIS — D70.9 NEUTROPENIA, UNSPECIFIED TYPE (HCC): ICD-10-CM

## 2018-01-01 DIAGNOSIS — E11.9 NEW ONSET TYPE 2 DIABETES MELLITUS (HCC): ICD-10-CM

## 2018-01-01 DIAGNOSIS — G47.00 INSOMNIA, UNSPECIFIED TYPE: ICD-10-CM

## 2018-01-01 DIAGNOSIS — R20.9 COLD HANDS: ICD-10-CM

## 2018-01-01 DIAGNOSIS — E86.0 DEHYDRATION: ICD-10-CM

## 2018-01-01 LAB
25(OH)D3 SERPL-MCNC: 20 NG/ML (ref 30–100)
25(OH)D3 SERPL-MCNC: 26 NG/ML (ref 30–100)
ALBUMIN SERPL BCP-MCNC: 1.7 G/DL (ref 3.2–4.9)
ALBUMIN SERPL BCP-MCNC: 2.5 G/DL (ref 3.2–4.9)
ALBUMIN SERPL BCP-MCNC: 3.2 G/DL (ref 3.2–4.9)
ALBUMIN SERPL BCP-MCNC: 3.3 G/DL (ref 3.2–4.9)
ALBUMIN SERPL BCP-MCNC: 3.7 G/DL (ref 3.2–4.9)
ALBUMIN SERPL BCP-MCNC: 3.7 G/DL (ref 3.2–4.9)
ALBUMIN SERPL BCP-MCNC: 4.2 G/DL (ref 3.2–4.9)
ALBUMIN/GLOB SERPL: 0.5 G/DL
ALBUMIN/GLOB SERPL: 0.7 G/DL
ALBUMIN/GLOB SERPL: 1.1 G/DL
ALBUMIN/GLOB SERPL: 1.2 G/DL
ALBUMIN/GLOB SERPL: 1.4 G/DL
ALP SERPL-CCNC: 1219 U/L (ref 30–99)
ALP SERPL-CCNC: 491 U/L (ref 30–99)
ALP SERPL-CCNC: 54 U/L (ref 30–99)
ALP SERPL-CCNC: 64 U/L (ref 30–99)
ALP SERPL-CCNC: 71 U/L (ref 30–99)
ALP SERPL-CCNC: 72 U/L (ref 30–99)
ALP SERPL-CCNC: 985 U/L (ref 30–99)
ALT SERPL-CCNC: 208 U/L (ref 2–50)
ALT SERPL-CCNC: 219 U/L (ref 2–50)
ALT SERPL-CCNC: 253 U/L (ref 2–50)
ALT SERPL-CCNC: 26 U/L (ref 2–50)
ALT SERPL-CCNC: 33 U/L (ref 2–50)
ALT SERPL-CCNC: 40 U/L (ref 2–50)
ALT SERPL-CCNC: 41 U/L (ref 2–50)
AMMONIA PLAS-SCNC: 40 UMOL/L (ref 11–45)
ANION GAP SERPL CALC-SCNC: 11 MMOL/L (ref 0–11.9)
ANION GAP SERPL CALC-SCNC: 11 MMOL/L (ref 0–11.9)
ANION GAP SERPL CALC-SCNC: 4 MMOL/L (ref 0–11.9)
ANION GAP SERPL CALC-SCNC: 5 MMOL/L (ref 0–11.9)
ANION GAP SERPL CALC-SCNC: 6 MMOL/L (ref 0–11.9)
ANION GAP SERPL CALC-SCNC: 7 MMOL/L (ref 0–11.9)
ANION GAP SERPL CALC-SCNC: 8 MMOL/L (ref 0–11.9)
ANION GAP SERPL CALC-SCNC: 9 MMOL/L (ref 0–11.9)
ANION GAP SERPL CALC-SCNC: 9 MMOL/L (ref 0–11.9)
ANISOCYTOSIS BLD QL SMEAR: ABNORMAL
APPEARANCE UR: CLEAR
APPEARANCE UR: CLEAR
AST SERPL-CCNC: 155 U/L (ref 12–45)
AST SERPL-CCNC: 20 U/L (ref 12–45)
AST SERPL-CCNC: 24 U/L (ref 12–45)
AST SERPL-CCNC: 26 U/L (ref 12–45)
AST SERPL-CCNC: 26 U/L (ref 12–45)
AST SERPL-CCNC: 270 U/L (ref 12–45)
AST SERPL-CCNC: 360 U/L (ref 12–45)
BASOPHILS # BLD AUTO: 0 % (ref 0–1.8)
BASOPHILS # BLD AUTO: 0.1 % (ref 0–1.8)
BASOPHILS # BLD AUTO: 0.9 % (ref 0–1.8)
BASOPHILS # BLD AUTO: 1 % (ref 0–1.8)
BASOPHILS # BLD AUTO: 1.1 % (ref 0–1.8)
BASOPHILS # BLD AUTO: 2 % (ref 0–1.8)
BASOPHILS # BLD: 0 K/UL (ref 0–0.12)
BASOPHILS # BLD: 0.01 K/UL (ref 0–0.12)
BASOPHILS # BLD: 0.04 K/UL (ref 0–0.12)
BASOPHILS # BLD: 0.05 K/UL (ref 0–0.12)
BASOPHILS # BLD: 0.05 K/UL (ref 0–0.12)
BASOPHILS # BLD: 0.11 K/UL (ref 0–0.12)
BILIRUB SERPL-MCNC: 0.8 MG/DL (ref 0.1–1.5)
BILIRUB SERPL-MCNC: 1 MG/DL (ref 0.1–1.5)
BILIRUB SERPL-MCNC: 1 MG/DL (ref 0.1–1.5)
BILIRUB SERPL-MCNC: 1.4 MG/DL (ref 0.1–1.5)
BILIRUB SERPL-MCNC: 25.2 MG/DL (ref 0.1–1.5)
BILIRUB SERPL-MCNC: 40 MG/DL (ref 0.1–1.5)
BILIRUB SERPL-MCNC: 51 MG/DL (ref 0.1–1.5)
BILIRUB UR QL STRIP.AUTO: NEGATIVE
BILIRUB UR QL STRIP.AUTO: NEGATIVE
BUN SERPL-MCNC: 20 MG/DL (ref 8–22)
BUN SERPL-MCNC: 22 MG/DL (ref 8–22)
BUN SERPL-MCNC: 23 MG/DL (ref 8–22)
BUN SERPL-MCNC: 27 MG/DL (ref 8–22)
BUN SERPL-MCNC: 30 MG/DL (ref 8–22)
BUN SERPL-MCNC: 39 MG/DL (ref 8–22)
BUN SERPL-MCNC: 49 MG/DL (ref 8–22)
BUN SERPL-MCNC: 49 MG/DL (ref 8–22)
BUN SERPL-MCNC: 62 MG/DL (ref 8–22)
BUN SERPL-MCNC: 74 MG/DL (ref 8–22)
BUN SERPL-MCNC: 79 MG/DL (ref 8–22)
BURR CELLS BLD QL SMEAR: NORMAL
CALCIUM SERPL-MCNC: 10 MG/DL (ref 8.5–10.5)
CALCIUM SERPL-MCNC: 8.5 MG/DL (ref 8.4–10.2)
CALCIUM SERPL-MCNC: 8.5 MG/DL (ref 8.4–10.2)
CALCIUM SERPL-MCNC: 8.7 MG/DL (ref 8.4–10.2)
CALCIUM SERPL-MCNC: 8.9 MG/DL (ref 8.4–10.2)
CALCIUM SERPL-MCNC: 9.1 MG/DL (ref 8.4–10.2)
CALCIUM SERPL-MCNC: 9.1 MG/DL (ref 8.5–10.5)
CALCIUM SERPL-MCNC: 9.3 MG/DL (ref 8.4–10.2)
CALCIUM SERPL-MCNC: 9.3 MG/DL (ref 8.4–10.2)
CALCIUM SERPL-MCNC: 9.3 MG/DL (ref 8.5–10.5)
CALCIUM SERPL-MCNC: 9.6 MG/DL (ref 8.4–10.2)
CHLORIDE SERPL-SCNC: 105 MMOL/L (ref 96–112)
CHLORIDE SERPL-SCNC: 106 MMOL/L (ref 96–112)
CHLORIDE SERPL-SCNC: 107 MMOL/L (ref 96–112)
CHLORIDE SERPL-SCNC: 108 MMOL/L (ref 96–112)
CHLORIDE SERPL-SCNC: 111 MMOL/L (ref 96–112)
CHLORIDE SERPL-SCNC: 95 MMOL/L (ref 96–112)
CHLORIDE SERPL-SCNC: 96 MMOL/L (ref 96–112)
CHLORIDE SERPL-SCNC: 98 MMOL/L (ref 96–112)
CO2 SERPL-SCNC: 16 MMOL/L (ref 20–33)
CO2 SERPL-SCNC: 17 MMOL/L (ref 20–33)
CO2 SERPL-SCNC: 19 MMOL/L (ref 20–33)
CO2 SERPL-SCNC: 20 MMOL/L (ref 20–33)
CO2 SERPL-SCNC: 20 MMOL/L (ref 20–33)
CO2 SERPL-SCNC: 21 MMOL/L (ref 20–33)
CO2 SERPL-SCNC: 22 MMOL/L (ref 20–33)
CO2 SERPL-SCNC: 22 MMOL/L (ref 20–33)
CO2 SERPL-SCNC: 24 MMOL/L (ref 20–33)
COLOR UR: YELLOW
COLOR UR: YELLOW
COMMENT 1642: NORMAL
CREAT SERPL-MCNC: 0.88 MG/DL (ref 0.5–1.4)
CREAT SERPL-MCNC: 0.9 MG/DL (ref 0.5–1.4)
CREAT SERPL-MCNC: 0.95 MG/DL (ref 0.5–1.4)
CREAT SERPL-MCNC: 0.95 MG/DL (ref 0.5–1.4)
CREAT SERPL-MCNC: 0.99 MG/DL (ref 0.5–1.4)
CREAT SERPL-MCNC: 1.02 MG/DL (ref 0.5–1.4)
CREAT SERPL-MCNC: 1.14 MG/DL (ref 0.5–1.4)
CREAT SERPL-MCNC: 1.31 MG/DL (ref 0.5–1.4)
CREAT SERPL-MCNC: 1.37 MG/DL (ref 0.5–1.4)
CREAT SERPL-MCNC: 1.49 MG/DL (ref 0.5–1.4)
CREAT SERPL-MCNC: 1.63 MG/DL (ref 0.5–1.4)
CREAT UR-MCNC: 16.1 MG/DL
CULTURE IF INDICATED INDCX: NO UA CULTURE
DACRYOCYTES BLD QL SMEAR: NORMAL
EOSINOPHIL # BLD AUTO: 0 K/UL (ref 0–0.51)
EOSINOPHIL # BLD AUTO: 0 K/UL (ref 0–0.51)
EOSINOPHIL # BLD AUTO: 0.05 K/UL (ref 0–0.51)
EOSINOPHIL # BLD AUTO: 0.06 K/UL (ref 0–0.51)
EOSINOPHIL # BLD AUTO: 0.08 K/UL (ref 0–0.51)
EOSINOPHIL # BLD AUTO: 0.14 K/UL (ref 0–0.51)
EOSINOPHIL NFR BLD: 0 % (ref 0–6.9)
EOSINOPHIL NFR BLD: 0 % (ref 0–6.9)
EOSINOPHIL NFR BLD: 1 % (ref 0–6.9)
EOSINOPHIL NFR BLD: 1 % (ref 0–6.9)
EOSINOPHIL NFR BLD: 1.8 % (ref 0–6.9)
EOSINOPHIL NFR BLD: 3.1 % (ref 0–6.9)
ERYTHROCYTE [DISTWIDTH] IN BLOOD BY AUTOMATED COUNT: 38.1 FL (ref 35.9–50)
ERYTHROCYTE [DISTWIDTH] IN BLOOD BY AUTOMATED COUNT: 38.5 FL (ref 35.9–50)
ERYTHROCYTE [DISTWIDTH] IN BLOOD BY AUTOMATED COUNT: 43.7 FL (ref 35.9–50)
ERYTHROCYTE [DISTWIDTH] IN BLOOD BY AUTOMATED COUNT: 45.7 FL (ref 35.9–50)
ERYTHROCYTE [DISTWIDTH] IN BLOOD BY AUTOMATED COUNT: 66.4 FL (ref 35.9–50)
ERYTHROCYTE [DISTWIDTH] IN BLOOD BY AUTOMATED COUNT: 74.2 FL (ref 35.9–50)
ERYTHROCYTE [DISTWIDTH] IN BLOOD BY AUTOMATED COUNT: 74.5 FL (ref 35.9–50)
EST. AVERAGE GLUCOSE BLD GHB EST-MCNC: 361 MG/DL
FOLATE SERPL-MCNC: 21.4 NG/ML
GIANT PLATELETS BLD QL SMEAR: NORMAL
GLOBULIN SER CALC-MCNC: 2.3 G/DL (ref 1.9–3.5)
GLOBULIN SER CALC-MCNC: 2.7 G/DL (ref 1.9–3.5)
GLOBULIN SER CALC-MCNC: 3.1 G/DL (ref 1.9–3.5)
GLOBULIN SER CALC-MCNC: 3.2 G/DL (ref 1.9–3.5)
GLOBULIN SER CALC-MCNC: 3.2 G/DL (ref 1.9–3.5)
GLOBULIN SER CALC-MCNC: 3.6 G/DL (ref 1.9–3.5)
GLOBULIN SER CALC-MCNC: 3.8 G/DL (ref 1.9–3.5)
GLUCOSE BLD-MCNC: 103 MG/DL (ref 65–99)
GLUCOSE BLD-MCNC: 111 MG/DL (ref 65–99)
GLUCOSE BLD-MCNC: 119 MG/DL (ref 65–99)
GLUCOSE BLD-MCNC: 133 MG/DL (ref 65–99)
GLUCOSE BLD-MCNC: 143 MG/DL (ref 65–99)
GLUCOSE BLD-MCNC: 146 MG/DL (ref 65–99)
GLUCOSE BLD-MCNC: 158 MG/DL (ref 65–99)
GLUCOSE BLD-MCNC: 167 MG/DL (ref 65–99)
GLUCOSE BLD-MCNC: 171 MG/DL (ref 65–99)
GLUCOSE BLD-MCNC: 177 MG/DL (ref 65–99)
GLUCOSE BLD-MCNC: 178 MG/DL (ref 65–99)
GLUCOSE BLD-MCNC: 194 MG/DL (ref 65–99)
GLUCOSE BLD-MCNC: 241 MG/DL (ref 65–99)
GLUCOSE BLD-MCNC: 278 MG/DL (ref 65–99)
GLUCOSE BLD-MCNC: 85 MG/DL (ref 65–99)
GLUCOSE BLD-MCNC: 90 MG/DL (ref 65–99)
GLUCOSE BLD-MCNC: 98 MG/DL (ref 65–99)
GLUCOSE BLD-MCNC: >600 MG/DL (ref 65–99)
GLUCOSE SERPL-MCNC: 107 MG/DL (ref 65–99)
GLUCOSE SERPL-MCNC: 110 MG/DL (ref 65–99)
GLUCOSE SERPL-MCNC: 149 MG/DL (ref 65–99)
GLUCOSE SERPL-MCNC: 166 MG/DL (ref 65–99)
GLUCOSE SERPL-MCNC: 268 MG/DL (ref 65–99)
GLUCOSE SERPL-MCNC: 363 MG/DL (ref 65–99)
GLUCOSE SERPL-MCNC: 530 MG/DL (ref 65–99)
GLUCOSE SERPL-MCNC: 619 MG/DL (ref 65–99)
GLUCOSE SERPL-MCNC: 77 MG/DL (ref 65–99)
GLUCOSE SERPL-MCNC: 83 MG/DL (ref 65–99)
GLUCOSE SERPL-MCNC: 99 MG/DL (ref 65–99)
GLUCOSE UR STRIP.AUTO-MCNC: 500 MG/DL
GLUCOSE UR STRIP.AUTO-MCNC: >=1000 MG/DL
HBA1C MFR BLD: 14.2 % (ref 0–5.6)
HCT VFR BLD AUTO: 31 % (ref 37–47)
HCT VFR BLD AUTO: 31.2 % (ref 37–47)
HCT VFR BLD AUTO: 35 % (ref 37–47)
HCT VFR BLD AUTO: 35.2 % (ref 37–47)
HCT VFR BLD AUTO: 38 % (ref 37–47)
HCT VFR BLD AUTO: 38.8 % (ref 37–47)
HCT VFR BLD AUTO: 40.3 % (ref 37–47)
HGB BLD-MCNC: 10.1 G/DL (ref 12–16)
HGB BLD-MCNC: 10.9 G/DL (ref 12–16)
HGB BLD-MCNC: 11.6 G/DL (ref 12–16)
HGB BLD-MCNC: 12.2 G/DL (ref 12–16)
HGB BLD-MCNC: 12.3 G/DL (ref 12–16)
HGB BLD-MCNC: 12.9 G/DL (ref 12–16)
HGB BLD-MCNC: 13.6 G/DL (ref 12–16)
HYPOCHROMIA BLD QL SMEAR: ABNORMAL
IMM GRANULOCYTES # BLD AUTO: 0.04 K/UL (ref 0–0.11)
IMM GRANULOCYTES # BLD AUTO: 0.04 K/UL (ref 0–0.11)
IMM GRANULOCYTES # BLD AUTO: 0.14 K/UL (ref 0–0.11)
IMM GRANULOCYTES # BLD AUTO: 0.16 K/UL (ref 0–0.11)
IMM GRANULOCYTES NFR BLD AUTO: 0.9 % (ref 0–0.9)
IMM GRANULOCYTES NFR BLD AUTO: 0.9 % (ref 0–0.9)
IMM GRANULOCYTES NFR BLD AUTO: 2 % (ref 0–0.9)
IMM GRANULOCYTES NFR BLD AUTO: 3.3 % (ref 0–0.9)
INR PPP: 1.42 (ref 0.87–1.13)
KETONES UR STRIP.AUTO-MCNC: NEGATIVE MG/DL
KETONES UR STRIP.AUTO-MCNC: NEGATIVE MG/DL
LEUKOCYTE ESTERASE UR QL STRIP.AUTO: NEGATIVE
LEUKOCYTE ESTERASE UR QL STRIP.AUTO: NEGATIVE
LIPASE SERPL-CCNC: 24 U/L (ref 7–58)
LYMPHOCYTES # BLD AUTO: 0.11 K/UL (ref 1–4.8)
LYMPHOCYTES # BLD AUTO: 0.53 K/UL (ref 1–4.8)
LYMPHOCYTES # BLD AUTO: 0.84 K/UL (ref 1–4.8)
LYMPHOCYTES # BLD AUTO: 0.9 K/UL (ref 1–4.8)
LYMPHOCYTES # BLD AUTO: 1.52 K/UL (ref 1–4.8)
LYMPHOCYTES # BLD AUTO: 2.36 K/UL (ref 1–4.8)
LYMPHOCYTES NFR BLD: 12 % (ref 22–41)
LYMPHOCYTES NFR BLD: 18.4 % (ref 22–41)
LYMPHOCYTES NFR BLD: 2 % (ref 22–41)
LYMPHOCYTES NFR BLD: 34.3 % (ref 22–41)
LYMPHOCYTES NFR BLD: 52.7 % (ref 22–41)
LYMPHOCYTES NFR BLD: 8 % (ref 22–41)
M TB TUBERC IFN-G BLD QL: ABNORMAL
M TB TUBERC IFN-G/MITOGEN IGNF BLD: -0.02
M TB TUBERC IGNF/MITOGEN IGNF CONTROL: 0.32 [IU]/ML
MACROCYTES BLD QL SMEAR: ABNORMAL
MAGNESIUM SERPL-MCNC: 2.5 MG/DL (ref 1.5–2.5)
MANUAL DIFF BLD: NORMAL
MANUAL DIFF BLD: NORMAL
MCH RBC QN AUTO: 31.2 PG (ref 27–33)
MCH RBC QN AUTO: 31.5 PG (ref 27–33)
MCH RBC QN AUTO: 31.6 PG (ref 27–33)
MCH RBC QN AUTO: 31.8 PG (ref 27–33)
MCH RBC QN AUTO: 32.5 PG (ref 27–33)
MCH RBC QN AUTO: 32.8 PG (ref 27–33)
MCH RBC QN AUTO: 33.2 PG (ref 27–33)
MCHC RBC AUTO-ENTMCNC: 32 G/DL (ref 33.6–35)
MCHC RBC AUTO-ENTMCNC: 32.1 G/DL (ref 33.6–35)
MCHC RBC AUTO-ENTMCNC: 32.6 G/DL (ref 33.6–35)
MCHC RBC AUTO-ENTMCNC: 33.1 G/DL (ref 33.6–35)
MCHC RBC AUTO-ENTMCNC: 34.9 G/DL (ref 33.6–35)
MCHC RBC AUTO-ENTMCNC: 34.9 G/DL (ref 33.6–35)
MCHC RBC AUTO-ENTMCNC: 35.1 G/DL (ref 33.6–35)
MCV RBC AUTO: 100.3 FL (ref 81.4–97.8)
MCV RBC AUTO: 90.2 FL (ref 81.4–97.8)
MCV RBC AUTO: 91 FL (ref 81.4–97.8)
MCV RBC AUTO: 94 FL (ref 81.4–97.8)
MCV RBC AUTO: 97.3 FL (ref 81.4–97.8)
MCV RBC AUTO: 98.2 FL (ref 81.4–97.8)
MCV RBC AUTO: 99.7 FL (ref 81.4–97.8)
METAMYELOCYTES NFR BLD MANUAL: 1 %
METAMYELOCYTES NFR BLD MANUAL: 1 %
MICRO URNS: ABNORMAL
MICRO URNS: ABNORMAL
MITOGEN IGNF BCKGRD COR BLD-ACNC: 0.29 [IU]/ML
MONOCYTES # BLD AUTO: 0.11 K/UL (ref 0–0.85)
MONOCYTES # BLD AUTO: 0.2 K/UL (ref 0–0.85)
MONOCYTES # BLD AUTO: 0.22 K/UL (ref 0–0.85)
MONOCYTES # BLD AUTO: 0.24 K/UL (ref 0–0.85)
MONOCYTES # BLD AUTO: 0.29 K/UL (ref 0–0.85)
MONOCYTES # BLD AUTO: 0.38 K/UL (ref 0–0.85)
MONOCYTES NFR BLD AUTO: 2 % (ref 0–13.4)
MONOCYTES NFR BLD AUTO: 3 % (ref 0–13.4)
MONOCYTES NFR BLD AUTO: 3.4 % (ref 0–13.4)
MONOCYTES NFR BLD AUTO: 5 % (ref 0–13.4)
MONOCYTES NFR BLD AUTO: 6.5 % (ref 0–13.4)
MONOCYTES NFR BLD AUTO: 7.8 % (ref 0–13.4)
MORPHOLOGY BLD-IMP: NORMAL
MYELOCYTES NFR BLD MANUAL: 1 %
NEUTROPHILS # BLD AUTO: 1.6 K/UL (ref 2–7.15)
NEUTROPHILS # BLD AUTO: 2.53 K/UL (ref 2–7.15)
NEUTROPHILS # BLD AUTO: 3.36 K/UL (ref 2–7.15)
NEUTROPHILS # BLD AUTO: 5.06 K/UL (ref 2–7.15)
NEUTROPHILS # BLD AUTO: 5.74 K/UL (ref 2–7.15)
NEUTROPHILS # BLD AUTO: 5.76 K/UL (ref 2–7.15)
NEUTROPHILS NFR BLD: 35.7 % (ref 44–72)
NEUTROPHILS NFR BLD: 57.1 % (ref 44–72)
NEUTROPHILS NFR BLD: 68.5 % (ref 44–72)
NEUTROPHILS NFR BLD: 82.5 % (ref 44–72)
NEUTROPHILS NFR BLD: 86 % (ref 44–72)
NEUTROPHILS NFR BLD: 90 % (ref 44–72)
NEUTS BAND NFR BLD MANUAL: 1 % (ref 0–10)
NEUTS BAND NFR BLD MANUAL: 2 % (ref 0–10)
NITRITE UR QL STRIP.AUTO: NEGATIVE
NITRITE UR QL STRIP.AUTO: NEGATIVE
NRBC # BLD AUTO: 0 K/UL
NRBC # BLD AUTO: 0.02 K/UL
NRBC BLD-RTO: 0 /100 WBC
NRBC BLD-RTO: 0.4 /100 WBC
PH UR STRIP.AUTO: 5 [PH]
PH UR STRIP.AUTO: 5.5 [PH]
PLATELET # BLD AUTO: 135 K/UL (ref 164–446)
PLATELET # BLD AUTO: 141 K/UL (ref 164–446)
PLATELET # BLD AUTO: 153 K/UL (ref 164–446)
PLATELET # BLD AUTO: 181 K/UL (ref 164–446)
PLATELET # BLD AUTO: 218 K/UL (ref 164–446)
PLATELET # BLD AUTO: 251 K/UL (ref 164–446)
PLATELET # BLD AUTO: 295 K/UL (ref 164–446)
PLATELET BLD QL SMEAR: NORMAL
PMV BLD AUTO: 11.1 FL (ref 9–12.9)
PMV BLD AUTO: 11.2 FL (ref 9–12.9)
PMV BLD AUTO: 11.5 FL (ref 9–12.9)
PMV BLD AUTO: 12.4 FL (ref 9–12.9)
PMV BLD AUTO: 12.7 FL (ref 9–12.9)
POIKILOCYTOSIS BLD QL SMEAR: NORMAL
POLYCHROMASIA BLD QL SMEAR: NORMAL
POLYCHROMASIA BLD QL SMEAR: NORMAL
POTASSIUM SERPL-SCNC: 3.7 MMOL/L (ref 3.6–5.5)
POTASSIUM SERPL-SCNC: 3.7 MMOL/L (ref 3.6–5.5)
POTASSIUM SERPL-SCNC: 3.9 MMOL/L (ref 3.6–5.5)
POTASSIUM SERPL-SCNC: 3.9 MMOL/L (ref 3.6–5.5)
POTASSIUM SERPL-SCNC: 4 MMOL/L (ref 3.6–5.5)
POTASSIUM SERPL-SCNC: 4.2 MMOL/L (ref 3.6–5.5)
POTASSIUM SERPL-SCNC: 4.2 MMOL/L (ref 3.6–5.5)
POTASSIUM SERPL-SCNC: 4.3 MMOL/L (ref 3.6–5.5)
POTASSIUM SERPL-SCNC: 4.4 MMOL/L (ref 3.6–5.5)
POTASSIUM SERPL-SCNC: 4.5 MMOL/L (ref 3.6–5.5)
POTASSIUM SERPL-SCNC: 4.9 MMOL/L (ref 3.6–5.5)
PROT SERPL-MCNC: 4.9 G/DL (ref 6–8.2)
PROT SERPL-MCNC: 5.6 G/DL (ref 6–8.2)
PROT SERPL-MCNC: 5.9 G/DL (ref 6–8.2)
PROT SERPL-MCNC: 6.1 G/DL (ref 6–8.2)
PROT SERPL-MCNC: 6.8 G/DL (ref 6–8.2)
PROT SERPL-MCNC: 6.9 G/DL (ref 6–8.2)
PROT SERPL-MCNC: 8 G/DL (ref 6–8.2)
PROT UR QL STRIP: NEGATIVE MG/DL
PROT UR QL STRIP: NEGATIVE MG/DL
PROTHROMBIN TIME: 17.2 SEC (ref 12–14.6)
RBC # BLD AUTO: 3.11 M/UL (ref 4.2–5.4)
RBC # BLD AUTO: 3.32 M/UL (ref 4.2–5.4)
RBC # BLD AUTO: 3.49 M/UL (ref 4.2–5.4)
RBC # BLD AUTO: 3.87 M/UL (ref 4.2–5.4)
RBC # BLD AUTO: 3.87 M/UL (ref 4.2–5.4)
RBC # BLD AUTO: 4.14 M/UL (ref 4.2–5.4)
RBC # BLD AUTO: 4.3 M/UL (ref 4.2–5.4)
RBC BLD AUTO: PRESENT
RBC UR QL AUTO: NEGATIVE
RBC UR QL AUTO: NEGATIVE
SODIUM SERPL-SCNC: 124 MMOL/L (ref 135–145)
SODIUM SERPL-SCNC: 126 MMOL/L (ref 135–145)
SODIUM SERPL-SCNC: 128 MMOL/L (ref 135–145)
SODIUM SERPL-SCNC: 131 MMOL/L (ref 135–145)
SODIUM SERPL-SCNC: 132 MMOL/L (ref 135–145)
SODIUM SERPL-SCNC: 132 MMOL/L (ref 135–145)
SODIUM SERPL-SCNC: 133 MMOL/L (ref 135–145)
SODIUM SERPL-SCNC: 135 MMOL/L (ref 135–145)
SODIUM SERPL-SCNC: 136 MMOL/L (ref 135–145)
SODIUM SERPL-SCNC: 136 MMOL/L (ref 135–145)
SODIUM SERPL-SCNC: 138 MMOL/L (ref 135–145)
SODIUM UR-SCNC: 38 MMOL/L
SP GR UR STRIP.AUTO: 1.02
SP GR UR STRIP.AUTO: <=1.005
TARGETS BLD QL SMEAR: NORMAL
TROPONIN I SERPL-MCNC: 0.09 NG/ML (ref 0–0.04)
TSH SERPL DL<=0.005 MIU/L-ACNC: 0.41 UIU/ML (ref 0.38–5.33)
TSH SERPL DL<=0.005 MIU/L-ACNC: 1.1 UIU/ML (ref 0.38–5.33)
UROBILINOGEN UR STRIP.AUTO-MCNC: 0.2 MG/DL
VIT B12 SERPL-MCNC: 341 PG/ML (ref 211–911)
WBC # BLD AUTO: 4.4 K/UL (ref 4.8–10.8)
WBC # BLD AUTO: 4.5 K/UL (ref 4.8–10.8)
WBC # BLD AUTO: 4.9 K/UL (ref 4.8–10.8)
WBC # BLD AUTO: 5.5 K/UL (ref 4.8–10.8)
WBC # BLD AUTO: 6.2 K/UL (ref 4.8–10.8)
WBC # BLD AUTO: 6.6 K/UL (ref 4.8–10.8)
WBC # BLD AUTO: 7 K/UL (ref 4.8–10.8)

## 2018-01-01 PROCEDURE — 665999 HH PPS REVENUE DEBIT

## 2018-01-01 PROCEDURE — 665998 HH PPS REVENUE CREDIT

## 2018-01-01 PROCEDURE — 85027 COMPLETE CBC AUTOMATED: CPT

## 2018-01-01 PROCEDURE — 82962 GLUCOSE BLOOD TEST: CPT | Mod: 91

## 2018-01-01 PROCEDURE — 700102 HCHG RX REV CODE 250 W/ 637 OVERRIDE(OP): Performed by: INTERNAL MEDICINE

## 2018-01-01 PROCEDURE — G0155 HHCP-SVS OF CSW,EA 15 MIN: HCPCS

## 2018-01-01 PROCEDURE — 85007 BL SMEAR W/DIFF WBC COUNT: CPT

## 2018-01-01 PROCEDURE — G0495 RN CARE TRAIN/EDU IN HH: HCPCS

## 2018-01-01 PROCEDURE — 80053 COMPREHEN METABOLIC PANEL: CPT

## 2018-01-01 PROCEDURE — 85025 COMPLETE CBC W/AUTO DIFF WBC: CPT

## 2018-01-01 PROCEDURE — 700105 HCHG RX REV CODE 258: Performed by: INTERNAL MEDICINE

## 2018-01-01 PROCEDURE — 6650390 HCR  ORASWAB GREEN

## 2018-01-01 PROCEDURE — A9270 NON-COVERED ITEM OR SERVICE: HCPCS | Performed by: HOSPITALIST

## 2018-01-01 PROCEDURE — 700105 HCHG RX REV CODE 258: Performed by: EMERGENCY MEDICINE

## 2018-01-01 PROCEDURE — 82962 GLUCOSE BLOOD TEST: CPT

## 2018-01-01 PROCEDURE — 99239 HOSP IP/OBS DSCHRG MGMT >30: CPT | Performed by: INTERNAL MEDICINE

## 2018-01-01 PROCEDURE — 36415 COLL VENOUS BLD VENIPUNCTURE: CPT

## 2018-01-01 PROCEDURE — 82140 ASSAY OF AMMONIA: CPT

## 2018-01-01 PROCEDURE — A4927 NON-STERILE GLOVES: HCPCS

## 2018-01-01 PROCEDURE — 700102 HCHG RX REV CODE 250 W/ 637 OVERRIDE(OP): Performed by: HOSPITALIST

## 2018-01-01 PROCEDURE — 700111 HCHG RX REV CODE 636 W/ 250 OVERRIDE (IP): Performed by: HOSPITALIST

## 2018-01-01 PROCEDURE — G0299 HHS/HOSPICE OF RN EA 15 MIN: HCPCS

## 2018-01-01 PROCEDURE — S9126 HOSPICE CARE, IN THE HOME, P: HCPCS

## 2018-01-01 PROCEDURE — 82570 ASSAY OF URINE CREATININE: CPT

## 2018-01-01 PROCEDURE — 83690 ASSAY OF LIPASE: CPT

## 2018-01-01 PROCEDURE — 36415 COLL VENOUS BLD VENIPUNCTURE: CPT | Mod: GA

## 2018-01-01 PROCEDURE — G0180 MD CERTIFICATION HHA PATIENT: HCPCS | Performed by: FAMILY MEDICINE

## 2018-01-01 PROCEDURE — 665001 SOC-HOME HEALTH

## 2018-01-01 PROCEDURE — 700111 HCHG RX REV CODE 636 W/ 250 OVERRIDE (IP): Performed by: INTERNAL MEDICINE

## 2018-01-01 PROCEDURE — 81003 URINALYSIS AUTO W/O SCOPE: CPT | Mod: 91

## 2018-01-01 PROCEDURE — 96372 THER/PROPH/DIAG INJ SC/IM: CPT

## 2018-01-01 PROCEDURE — 99213 OFFICE O/P EST LOW 20 MIN: CPT | Performed by: FAMILY MEDICINE

## 2018-01-01 PROCEDURE — 99223 1ST HOSP IP/OBS HIGH 75: CPT | Mod: AI | Performed by: HOSPITALIST

## 2018-01-01 PROCEDURE — 99239 HOSP IP/OBS DSCHRG MGMT >30: CPT | Performed by: HOSPITALIST

## 2018-01-01 PROCEDURE — 74170 CT ABD WO CNTRST FLWD CNTRST: CPT

## 2018-01-01 PROCEDURE — 700105 HCHG RX REV CODE 258: Performed by: HOSPITALIST

## 2018-01-01 PROCEDURE — 76700 US EXAM ABDOM COMPLETE: CPT

## 2018-01-01 PROCEDURE — 99497 ADVNCD CARE PLAN 30 MIN: CPT | Mod: 25 | Performed by: HOSPITALIST

## 2018-01-01 PROCEDURE — A6250 SKIN SEAL PROTECT MOISTURIZR: HCPCS

## 2018-01-01 PROCEDURE — 81003 URINALYSIS AUTO W/O SCOPE: CPT

## 2018-01-01 PROCEDURE — 99214 OFFICE O/P EST MOD 30 MIN: CPT | Performed by: FAMILY MEDICINE

## 2018-01-01 PROCEDURE — G0162 HHC RN E&M PLAN SVS, 15 MIN: HCPCS

## 2018-01-01 PROCEDURE — 770006 HCHG ROOM/CARE - MED/SURG/GYN SEMI*

## 2018-01-01 PROCEDURE — A4335 INCONTINENCE SUPPLY: HCPCS

## 2018-01-01 PROCEDURE — 84443 ASSAY THYROID STIM HORMONE: CPT

## 2018-01-01 PROCEDURE — A9270 NON-COVERED ITEM OR SERVICE: HCPCS | Performed by: INTERNAL MEDICINE

## 2018-01-01 PROCEDURE — 80048 BASIC METABOLIC PNL TOTAL CA: CPT

## 2018-01-01 PROCEDURE — 96360 HYDRATION IV INFUSION INIT: CPT

## 2018-01-01 PROCEDURE — 99285 EMERGENCY DEPT VISIT HI MDM: CPT

## 2018-01-01 PROCEDURE — 82607 VITAMIN B-12: CPT

## 2018-01-01 PROCEDURE — 99232 SBSQ HOSP IP/OBS MODERATE 35: CPT | Performed by: INTERNAL MEDICINE

## 2018-01-01 PROCEDURE — 700102 HCHG RX REV CODE 250 W/ 637 OVERRIDE(OP): Performed by: EMERGENCY MEDICINE

## 2018-01-01 PROCEDURE — 99215 OFFICE O/P EST HI 40 MIN: CPT | Performed by: FAMILY MEDICINE

## 2018-01-01 PROCEDURE — 93000 ELECTROCARDIOGRAM COMPLETE: CPT | Performed by: FAMILY MEDICINE

## 2018-01-01 PROCEDURE — 71046 X-RAY EXAM CHEST 2 VIEWS: CPT

## 2018-01-01 PROCEDURE — 80053 COMPREHEN METABOLIC PANEL: CPT | Mod: 91

## 2018-01-01 PROCEDURE — 20610 DRAIN/INJ JOINT/BURSA W/O US: CPT | Performed by: FAMILY MEDICINE

## 2018-01-01 PROCEDURE — 83036 HEMOGLOBIN GLYCOSYLATED A1C: CPT | Mod: GA

## 2018-01-01 PROCEDURE — 700102 HCHG RX REV CODE 250 W/ 637 OVERRIDE(OP)

## 2018-01-01 PROCEDURE — A5120 SKIN BARRIER, WIPE OR SWAB: HCPCS

## 2018-01-01 PROCEDURE — 73030 X-RAY EXAM OF SHOULDER: CPT | Mod: LT

## 2018-01-01 PROCEDURE — 99233 SBSQ HOSP IP/OBS HIGH 50: CPT | Performed by: HOSPITALIST

## 2018-01-01 PROCEDURE — 99223 1ST HOSP IP/OBS HIGH 75: CPT | Performed by: INTERNAL MEDICINE

## 2018-01-01 PROCEDURE — G0151 HHCP-SERV OF PT,EA 15 MIN: HCPCS

## 2018-01-01 PROCEDURE — G0493 RN CARE EA 15 MIN HH/HOSPICE: HCPCS

## 2018-01-01 PROCEDURE — 85610 PROTHROMBIN TIME: CPT

## 2018-01-01 PROCEDURE — 94760 N-INVAS EAR/PLS OXIMETRY 1: CPT

## 2018-01-01 PROCEDURE — 82746 ASSAY OF FOLIC ACID SERUM: CPT

## 2018-01-01 PROCEDURE — 73030 X-RAY EXAM OF SHOULDER: CPT | Mod: RT

## 2018-01-01 PROCEDURE — 82306 VITAMIN D 25 HYDROXY: CPT | Mod: GA

## 2018-01-01 PROCEDURE — 83735 ASSAY OF MAGNESIUM: CPT

## 2018-01-01 PROCEDURE — 6650990 HC HOSPICE AND HOME CARE RX REV CODE 0250

## 2018-01-01 PROCEDURE — 99232 SBSQ HOSP IP/OBS MODERATE 35: CPT | Performed by: HOSPITALIST

## 2018-01-01 PROCEDURE — 84484 ASSAY OF TROPONIN QUANT: CPT

## 2018-01-01 PROCEDURE — 700117 HCHG RX CONTRAST REV CODE 255: Performed by: FAMILY MEDICINE

## 2018-01-01 PROCEDURE — G0157 HHC PT ASSISTANT EA 15: HCPCS

## 2018-01-01 PROCEDURE — 665036 HSPC NOTICE OF ELECTION NOE

## 2018-01-01 PROCEDURE — 99213 OFFICE O/P EST LOW 20 MIN: CPT | Performed by: INTERNAL MEDICINE

## 2018-01-01 PROCEDURE — 86480 TB TEST CELL IMMUN MEASURE: CPT

## 2018-01-01 PROCEDURE — 82306 VITAMIN D 25 HYDROXY: CPT

## 2018-01-01 PROCEDURE — 84443 ASSAY THYROID STIM HORMONE: CPT | Mod: GA

## 2018-01-01 PROCEDURE — G0270 MNT SUBS TX FOR CHANGE DX: HCPCS

## 2018-01-01 PROCEDURE — 84300 ASSAY OF URINE SODIUM: CPT

## 2018-01-01 RX ORDER — POLYETHYLENE GLYCOL 3350 17 G/17G
1 POWDER, FOR SOLUTION ORAL 2 TIMES DAILY
Status: DISCONTINUED | OUTPATIENT
Start: 2018-01-01 | End: 2018-01-01 | Stop reason: HOSPADM

## 2018-01-01 RX ORDER — ONDANSETRON 2 MG/ML
4 INJECTION INTRAMUSCULAR; INTRAVENOUS EVERY 4 HOURS PRN
Status: DISCONTINUED | OUTPATIENT
Start: 2018-01-01 | End: 2018-01-01 | Stop reason: HOSPADM

## 2018-01-01 RX ORDER — AZITHROMYCIN 250 MG/1
TABLET, FILM COATED ORAL
Qty: 6 TAB | Refills: 0 | Status: SHIPPED | OUTPATIENT
Start: 2018-01-01 | End: 2018-01-01

## 2018-01-01 RX ORDER — ONDANSETRON 4 MG/1
4 TABLET, ORALLY DISINTEGRATING ORAL EVERY 4 HOURS PRN
Status: DISCONTINUED | OUTPATIENT
Start: 2018-01-01 | End: 2018-01-01 | Stop reason: HOSPADM

## 2018-01-01 RX ORDER — DEXTROSE MONOHYDRATE 25 G/50ML
25 INJECTION, SOLUTION INTRAVENOUS
Status: DISCONTINUED | OUTPATIENT
Start: 2018-01-01 | End: 2018-01-01

## 2018-01-01 RX ORDER — POLYETHYLENE GLYCOL 3350 17 G/17G
1 POWDER, FOR SOLUTION ORAL
Status: DISCONTINUED | OUTPATIENT
Start: 2018-01-01 | End: 2018-01-01

## 2018-01-01 RX ORDER — ACETAMINOPHEN 325 MG/1
650 TABLET ORAL EVERY 6 HOURS PRN
Status: DISCONTINUED | OUTPATIENT
Start: 2018-01-01 | End: 2018-01-01 | Stop reason: HOSPADM

## 2018-01-01 RX ORDER — LANCETS 28 GAUGE
1 EACH MISCELLANEOUS
Qty: 120 EACH | Refills: 3 | Status: SHIPPED | OUTPATIENT
Start: 2018-01-01 | End: 2018-01-01

## 2018-01-01 RX ORDER — CYCLOSPORINE 0.5 MG/ML
1 EMULSION OPHTHALMIC 2 TIMES DAILY
Status: DISCONTINUED | OUTPATIENT
Start: 2018-01-01 | End: 2018-01-01

## 2018-01-01 RX ORDER — SODIUM CHLORIDE 9 MG/ML
INJECTION, SOLUTION INTRAVENOUS CONTINUOUS
Status: DISCONTINUED | OUTPATIENT
Start: 2018-01-01 | End: 2018-01-01

## 2018-01-01 RX ORDER — BISACODYL 10 MG
10 SUPPOSITORY, RECTAL RECTAL
Status: DISCONTINUED | OUTPATIENT
Start: 2018-01-01 | End: 2018-01-01 | Stop reason: HOSPADM

## 2018-01-01 RX ORDER — POLYVINYL ALCOHOL 14 MG/ML
1-2 SOLUTION/ DROPS OPHTHALMIC
Status: DISCONTINUED | OUTPATIENT
Start: 2018-01-01 | End: 2018-01-01 | Stop reason: HOSPADM

## 2018-01-01 RX ORDER — MULTIVIT WITH MINERALS/LUTEIN
1 TABLET ORAL DAILY
Status: DISCONTINUED | OUTPATIENT
Start: 2018-01-01 | End: 2018-01-01 | Stop reason: HOSPADM

## 2018-01-01 RX ORDER — DRONABINOL 5 MG/1
5 CAPSULE ORAL 2 TIMES DAILY
Qty: 14 CAP | Refills: 0 | Status: SHIPPED | OUTPATIENT
Start: 2018-01-01 | End: 2018-06-14

## 2018-01-01 RX ORDER — SODIUM CHLORIDE 9 MG/ML
1000 INJECTION, SOLUTION INTRAVENOUS ONCE
Status: COMPLETED | OUTPATIENT
Start: 2018-01-01 | End: 2018-01-01

## 2018-01-01 RX ORDER — LOSARTAN POTASSIUM 25 MG/1
50 TABLET ORAL DAILY
Status: DISCONTINUED | OUTPATIENT
Start: 2018-01-01 | End: 2018-01-01 | Stop reason: HOSPADM

## 2018-01-01 RX ORDER — DRONABINOL 2.5 MG/1
5 CAPSULE ORAL
Status: DISCONTINUED | OUTPATIENT
Start: 2018-01-01 | End: 2018-01-01 | Stop reason: HOSPADM

## 2018-01-01 RX ORDER — INSULIN GLARGINE 100 [IU]/ML
30 INJECTION, SOLUTION SUBCUTANEOUS EVERY EVENING
Status: DISCONTINUED | OUTPATIENT
Start: 2018-01-01 | End: 2018-01-01 | Stop reason: HOSPADM

## 2018-01-01 RX ORDER — LOSARTAN POTASSIUM 50 MG/1
50 TABLET ORAL DAILY
COMMUNITY
End: 2018-01-01 | Stop reason: HOSPADM

## 2018-01-01 RX ORDER — DEXTROSE MONOHYDRATE 25 G/50ML
25 INJECTION, SOLUTION INTRAVENOUS
Status: DISCONTINUED | OUTPATIENT
Start: 2018-01-01 | End: 2018-01-01 | Stop reason: HOSPADM

## 2018-01-01 RX ORDER — INSULIN GLARGINE 100 [IU]/ML
10 INJECTION, SOLUTION SUBCUTANEOUS ONCE
Status: DISCONTINUED | OUTPATIENT
Start: 2018-01-01 | End: 2018-01-01 | Stop reason: CLARIF

## 2018-01-01 RX ORDER — LOSARTAN POTASSIUM 50 MG/1
100 TABLET ORAL DAILY
Status: ON HOLD | COMMUNITY
Start: 2018-01-01 | End: 2018-01-01

## 2018-01-01 RX ORDER — AMOXICILLIN 250 MG
2 CAPSULE ORAL 2 TIMES DAILY
Status: DISCONTINUED | OUTPATIENT
Start: 2018-01-01 | End: 2018-01-01

## 2018-01-01 RX ORDER — POLYETHYLENE GLYCOL 3350 17 G/17G
1 POWDER, FOR SOLUTION ORAL
Status: DISCONTINUED | OUTPATIENT
Start: 2018-01-01 | End: 2018-01-01 | Stop reason: HOSPADM

## 2018-01-01 RX ORDER — BISACODYL 10 MG
10 SUPPOSITORY, RECTAL RECTAL
Status: DISCONTINUED | OUTPATIENT
Start: 2018-01-01 | End: 2018-01-01

## 2018-01-01 RX ORDER — TRIAMCINOLONE ACETONIDE 40 MG/ML
160 INJECTION, SUSPENSION INTRA-ARTICULAR; INTRAMUSCULAR ONCE
OUTPATIENT
Start: 2018-01-01 | End: 2018-01-01

## 2018-01-01 RX ORDER — HEPARIN SODIUM 5000 [USP'U]/ML
5000 INJECTION, SOLUTION INTRAVENOUS; SUBCUTANEOUS EVERY 8 HOURS
Status: DISCONTINUED | OUTPATIENT
Start: 2018-01-01 | End: 2018-01-01 | Stop reason: HOSPADM

## 2018-01-01 RX ORDER — AMOXICILLIN 250 MG
2 CAPSULE ORAL 2 TIMES DAILY
Status: DISCONTINUED | OUTPATIENT
Start: 2018-01-01 | End: 2018-01-01 | Stop reason: HOSPADM

## 2018-01-01 RX ORDER — LOSARTAN POTASSIUM 100 MG/1
100 TABLET ORAL DAILY
COMMUNITY
End: 2018-01-01

## 2018-01-01 RX ORDER — MAG HYDROX/ALUMINUM HYD/SIMETH 400-400-40
1 SUSPENSION, ORAL (FINAL DOSE FORM) ORAL
Status: ON HOLD | COMMUNITY
End: 2018-01-01

## 2018-01-01 RX ADMIN — POLYETHYLENE GLYCOL (3350) 1 PACKET: 17 POWDER, FOR SOLUTION ORAL at 05:53

## 2018-01-01 RX ADMIN — HEPARIN SODIUM 5000 UNITS: 5000 INJECTION, SOLUTION INTRAVENOUS; SUBCUTANEOUS at 06:09

## 2018-01-01 RX ADMIN — STANDARDIZED SENNA CONCENTRATE AND DOCUSATE SODIUM 2 TABLET: 8.6; 5 TABLET, FILM COATED ORAL at 09:28

## 2018-01-01 RX ADMIN — SODIUM CHLORIDE: 9 INJECTION, SOLUTION INTRAVENOUS at 20:19

## 2018-01-01 RX ADMIN — DRONABINOL 5 MG: 2.5 CAPSULE ORAL at 12:13

## 2018-01-01 RX ADMIN — STANDARDIZED SENNA CONCENTRATE AND DOCUSATE SODIUM 2 TABLET: 8.6; 5 TABLET, FILM COATED ORAL at 08:45

## 2018-01-01 RX ADMIN — ACETAMINOPHEN 650 MG: 325 TABLET, FILM COATED ORAL at 20:55

## 2018-01-01 RX ADMIN — SODIUM CHLORIDE: 9 INJECTION, SOLUTION INTRAVENOUS at 14:56

## 2018-01-01 RX ADMIN — HEPARIN SODIUM 5000 UNITS: 5000 INJECTION, SOLUTION INTRAVENOUS; SUBCUTANEOUS at 22:17

## 2018-01-01 RX ADMIN — LOSARTAN POTASSIUM 50 MG: 25 TABLET ORAL at 14:58

## 2018-01-01 RX ADMIN — DRONABINOL 5 MG: 2.5 CAPSULE ORAL at 18:16

## 2018-01-01 RX ADMIN — INSULIN GLARGINE 30 UNITS: 100 INJECTION, SOLUTION SUBCUTANEOUS at 22:34

## 2018-01-01 RX ADMIN — DRONABINOL 5 MG: 2.5 CAPSULE ORAL at 11:54

## 2018-01-01 RX ADMIN — INSULIN GLARGINE 30 UNITS: 100 INJECTION, SOLUTION SUBCUTANEOUS at 21:03

## 2018-01-01 RX ADMIN — DRONABINOL 5 MG: 2.5 CAPSULE ORAL at 11:57

## 2018-01-01 RX ADMIN — STANDARDIZED SENNA CONCENTRATE AND DOCUSATE SODIUM 2 TABLET: 8.6; 5 TABLET, FILM COATED ORAL at 21:13

## 2018-01-01 RX ADMIN — STANDARDIZED SENNA CONCENTRATE AND DOCUSATE SODIUM 2 TABLET: 8.6; 5 TABLET, FILM COATED ORAL at 23:32

## 2018-01-01 RX ADMIN — DRONABINOL 5 MG: 2.5 CAPSULE ORAL at 16:53

## 2018-01-01 RX ADMIN — HEPARIN SODIUM 5000 UNITS: 5000 INJECTION, SOLUTION INTRAVENOUS; SUBCUTANEOUS at 23:32

## 2018-01-01 RX ADMIN — HEPARIN SODIUM 5000 UNITS: 5000 INJECTION, SOLUTION INTRAVENOUS; SUBCUTANEOUS at 06:24

## 2018-01-01 RX ADMIN — Medication 1000 UNITS: at 08:42

## 2018-01-01 RX ADMIN — SODIUM CHLORIDE 1000 ML: 9 INJECTION, SOLUTION INTRAVENOUS at 21:18

## 2018-01-01 RX ADMIN — HEPARIN SODIUM 5000 UNITS: 5000 INJECTION, SOLUTION INTRAVENOUS; SUBCUTANEOUS at 22:31

## 2018-01-01 RX ADMIN — SODIUM CHLORIDE: 9 INJECTION, SOLUTION INTRAVENOUS at 23:33

## 2018-01-01 RX ADMIN — INSULIN HUMAN 10 UNITS: 100 INJECTION, SOLUTION PARENTERAL at 22:09

## 2018-01-01 RX ADMIN — INSULIN HUMAN 5 UNITS: 100 INJECTION, SOLUTION PARENTERAL at 06:18

## 2018-01-01 RX ADMIN — STANDARDIZED SENNA CONCENTRATE AND DOCUSATE SODIUM 2 TABLET: 8.6; 5 TABLET, FILM COATED ORAL at 22:32

## 2018-01-01 RX ADMIN — SODIUM CHLORIDE: 9 INJECTION, SOLUTION INTRAVENOUS at 01:00

## 2018-01-01 RX ADMIN — HEPARIN SODIUM 5000 UNITS: 5000 INJECTION, SOLUTION INTRAVENOUS; SUBCUTANEOUS at 20:19

## 2018-01-01 RX ADMIN — Medication 1000 UNITS: at 14:58

## 2018-01-01 RX ADMIN — HEPARIN SODIUM 5000 UNITS: 5000 INJECTION, SOLUTION INTRAVENOUS; SUBCUTANEOUS at 06:05

## 2018-01-01 RX ADMIN — Medication 1000 UNITS: at 09:14

## 2018-01-01 RX ADMIN — IOHEXOL 100 ML: 350 INJECTION, SOLUTION INTRAVENOUS at 10:15

## 2018-01-01 RX ADMIN — SODIUM CHLORIDE: 9 INJECTION, SOLUTION INTRAVENOUS at 12:13

## 2018-01-01 RX ADMIN — STANDARDIZED SENNA CONCENTRATE AND DOCUSATE SODIUM 2 TABLET: 8.6; 5 TABLET, FILM COATED ORAL at 20:55

## 2018-01-01 RX ADMIN — POLYETHYLENE GLYCOL (3350) 1 PACKET: 17 POWDER, FOR SOLUTION ORAL at 18:20

## 2018-01-01 RX ADMIN — Medication 1000 UNITS: at 09:57

## 2018-01-01 RX ADMIN — HEPARIN SODIUM 5000 UNITS: 5000 INJECTION, SOLUTION INTRAVENOUS; SUBCUTANEOUS at 21:14

## 2018-01-01 RX ADMIN — POLYETHYLENE GLYCOL (3350) 1 PACKET: 17 POWDER, FOR SOLUTION ORAL at 06:19

## 2018-01-01 RX ADMIN — SODIUM CHLORIDE 1000 ML: 9 INJECTION, SOLUTION INTRAVENOUS at 11:09

## 2018-01-01 RX ADMIN — INSULIN GLARGINE 30 UNITS: 100 INJECTION, SOLUTION SUBCUTANEOUS at 23:50

## 2018-01-01 RX ADMIN — ACETAMINOPHEN 650 MG: 325 TABLET, FILM COATED ORAL at 15:07

## 2018-01-01 RX ADMIN — DRONABINOL 5 MG: 2.5 CAPSULE ORAL at 18:20

## 2018-01-01 RX ADMIN — HEPARIN SODIUM 5000 UNITS: 5000 INJECTION, SOLUTION INTRAVENOUS; SUBCUTANEOUS at 06:21

## 2018-01-01 RX ADMIN — INSULIN HUMAN 1 UNITS: 100 INJECTION, SOLUTION PARENTERAL at 20:09

## 2018-01-01 RX ADMIN — HEPARIN SODIUM 5000 UNITS: 5000 INJECTION, SOLUTION INTRAVENOUS; SUBCUTANEOUS at 05:55

## 2018-01-01 RX ADMIN — HEPARIN SODIUM 5000 UNITS: 5000 INJECTION, SOLUTION INTRAVENOUS; SUBCUTANEOUS at 13:43

## 2018-01-01 RX ADMIN — POLYETHYLENE GLYCOL (3350) 1 PACKET: 17 POWDER, FOR SOLUTION ORAL at 14:58

## 2018-01-01 RX ADMIN — HEPARIN SODIUM 5000 UNITS: 5000 INJECTION, SOLUTION INTRAVENOUS; SUBCUTANEOUS at 06:03

## 2018-01-01 SDOH — ECONOMIC STABILITY: HOUSING INSECURITY: UNSAFE COOKING RANGE AREA: 0

## 2018-01-01 SDOH — ECONOMIC STABILITY: HOUSING INSECURITY: UNSAFE APPLIANCES: 0

## 2018-01-01 SDOH — ECONOMIC STABILITY: GENERAL

## 2018-01-01 ASSESSMENT — ACTIVITIES OF DAILY LIVING (ADL)
GROOMING_ASSISTANCE: 0
DRESSING_REQUIRES_ASSISTANCE: 1
MONEY MANAGEMENT (EXPENSES/BILLS): TOTALLY DEPENDENT
ORAL_CARE_ASSISTANCE: 0
BATHING_ASSISTANCE: 1
BATHING_REQUIRES_ASSISTANCE: 1
DRESSING_UB_ASSISTANCE: 0
TRANSPORTATION_ASSISTANCE: 6
DRESSING_LB_ASSISTANCE: 0
PHYSICAL_TRANSFER_REQUIRES_ASSISTANCE: 1
HOME_HEALTH_OASIS: 01
TOILETING_ASSISTANCE: 0
BATHING_REQUIRES_ASSISTANCE: 1
AMBULATION_REQUIRES_ASSISTANCE: 1
CONTINENCE_REQUIRES_ASSISTANCE: 1
PHYSICAL_TRANSFER_REQUIRES_ASSISTANCE: 1
EATING_ASSISTANCE: 0
OASIS_M1830: 01
SHOPPING_ASSISTANCE: 6

## 2018-01-01 ASSESSMENT — ENCOUNTER SYMPTOMS
SORE THROAT: 0
PALPITATIONS: 0
COUGH: 0
BLOOD IN STOOL: 0
WEAKNESS: 0
DIZZINESS: 0
EYE PAIN: 0
DEPRESSION: 0
SENSORY CHANGE: 0
WEIGHT LOSS: 0
FLANK PAIN: 0
SORE THROAT: 0
MEMORY LOSS: 0
NAUSEA: DENIES
RESPIRATORY SYMPTOMS COMMENTS: NO S/S OF RESP DISTRESS
DIARRHEA: 0
NEUROLOGICAL NEGATIVE: 1
PND: 0
HEADACHES: 0
HEARTBURN: 0
VOMITING: 0
DOUBLE VISION: 0
BLOOD IN STOOL: 0
ABDOMINAL PAIN: 0
VOMITING: DENIES
FOCAL WEAKNESS: 0
VOMITING: 0
RESPIRATORY NEGATIVE: 1
BACK PAIN: 1
CONSTITUTIONAL NEGATIVE: 1
CONSTIPATION: 0
HALLUCINATIONS: 0
DIARRHEA: 0
DIZZINESS: 0
MYALGIAS: 0
SHORTNESS OF BREATH: 0
CHILLS: 0
MYALGIAS: 0
CHILLS: 0
NAUSEA: 1
HEADACHES: 0
INSOMNIA: 0
BLURRED VISION: 0
BRUISES/BLEEDS EASILY: 0
CHILLS: 0
NAUSEA: DENIES
BACK PAIN: 0
DIZZINESS: 0
BRUISES/BLEEDS EASILY: 0
WEIGHT LOSS: 0
CARDIOVASCULAR NEGATIVE: 1
RESPIRATORY SYMPTOMS COMMENTS: NO S/S OF RESP DISTRESS
NAUSEA: 0
CHILLS: 0
ABDOMINAL PAIN: 0
SHORTNESS OF BREATH: 0
FLANK PAIN: 0
VOMITING: 0
DIZZINESS: 0
SHORTNESS OF BREATH: 0
WEAKNESS: 0
NEUROLOGICAL NEGATIVE: 1
WEIGHT LOSS: 1
MUSCULOSKELETAL NEGATIVE: 1
PHOTOPHOBIA: 0
NERVOUS/ANXIOUS: 0
TINGLING: 0
LOSS OF CONSCIOUSNESS: 0
DEPRESSION: 0
WEAKNESS: 1
EYE REDNESS: 0
COUGH: 0
VOMITING: 0
DEPRESSION: 0
WEAKNESS: 0
WEIGHT LOSS: 0
ORTHOPNEA: 0
CONSTITUTIONAL NEGATIVE: 1
WEAKNESS: 0
VOMITING: 0
NERVOUS/ANXIOUS: 0
DIARRHEA: 0
PSYCHIATRIC NEGATIVE: 1
FLANK PAIN: 0
PALPITATIONS: 0
ABDOMINAL PAIN: 0
BACK PAIN: 0
MUSCULOSKELETAL NEGATIVE: 1
NAUSEA: 0
SLEEP QUALITY: ADEQUATE
CARDIOVASCULAR NEGATIVE: 1
NAUSEA: 0
CONSTIPATION: 1
LAST BOWEL MOVEMENT: 64805
NAUSEA: DENIES
GASTROINTESTINAL NEGATIVE: 1
DEPRESSION: 0
SOMNOLENCE: 1
DEPRESSION: 0
RESPIRATORY SYMPTOMS COMMENTS: NO S/S OF RESP DISTRESS
ABDOMINAL PAIN: 0
POLYDIPSIA: 0
NECK PAIN: 0
ABDOMINAL PAIN: 0
NECK PAIN: 0
PSYCHIATRIC NEGATIVE: 1
WEAKNESS: 0
FEVER: 0
DEPRESSION: 0
SHORTNESS OF BREATH: T
SORE THROAT: 0
CLAUDICATION: 0
HEARTBURN: 0
NAUSEA: DENIES
EYE PAIN: 0
SPEECH CHANGE: 0
BRUISES/BLEEDS EASILY: 0
BACK PAIN: 0
BACK PAIN: 0
STRIDOR: 0
LOSS OF CONSCIOUSNESS: 0
HEADACHES: 0
FEVER: 0
DIZZINESS: 0
ORTHOPNEA: 0
SHORTNESS OF BREATH: 0
HEMOPTYSIS: 0
WEIGHT LOSS: 0
CHILLS: 0
FEVER: 0
ABDOMINAL PAIN: 0
WEAKNESS: 1
BACK PAIN: 0
SEIZURES: 0
RESPIRATORY SYMPTOMS COMMENTS: NO S/S OF RESP DISTRESS
SEVERE DYSPNEA: 1
NEUROLOGICAL NEGATIVE: 1
EYE DISCHARGE: 0
DIARRHEA: 0
DRY SKIN: 1
BLOOD IN STOOL: 0
RESPIRATORY NEGATIVE: 1
DIZZINESS: 0
NERVOUS/ANXIOUS: 0
FEVER: 0
HEARTBURN: 0
NAUSEA: 0
POLYDIPSIA: 1
BLURRED VISION: 0
MYALGIAS: 0
NAUSEA: 0
CHILLS: 0
GASTROINTESTINAL NEGATIVE: 1
CARDIOVASCULAR NEGATIVE: 1
FLANK PAIN: 0
HEARTBURN: 0
FEVER: 0
MYALGIAS: 0
GASTROINTESTINAL NEGATIVE: 1
CONSTITUTIONAL NEGATIVE: 1
STOOL FREQUENCY: LESS THAN DAILY
COUGH: 0
NERVOUS/ANXIOUS: 0
NAUSEA: 0
BACK PAIN: 0
DIZZINESS: 0
PALPITATIONS: 0
DEPRESSION: 0
HALLUCINATIONS: 0
COUGH: 0
SPUTUM PRODUCTION: 0
SHORTNESS OF BREATH: 0
MYALGIAS: 0
FOCAL WEAKNESS: 0
VOMITING: DENIES
RESPIRATORY NEGATIVE: 1
MUSCULOSKELETAL NEGATIVE: 1
FEVER: 0
PALPITATIONS: 0
SHORTNESS OF BREATH: T
CHILLS: 0
FEVER: 0
BACK PAIN: 0
VOMITING: DENIES
TREMORS: 0
VOMITING: 0
COUGH: 0
ABDOMINAL PAIN: 0
COUGH: 0
STRIDOR: 0
FATIGUE: 1
VOMITING: 0
FOCAL WEAKNESS: 0
VOMITING: DENIES
SHORTNESS OF BREATH: 0
PSYCHIATRIC NEGATIVE: 1
NAUSEA: 0
MYALGIAS: 0
SHORTNESS OF BREATH: 0
HEADACHES: 0
COUGH: 0
LOSS OF CONSCIOUSNESS: 0
POLYDIPSIA: 0
NERVOUS/ANXIOUS: 0
MYALGIAS: 0
POLYDIPSIA: 0
SPUTUM PRODUCTION: 0

## 2018-01-01 ASSESSMENT — PAIN SCALES - GENERAL
PAINLEVEL_OUTOF10: 0
PAINLEVEL_OUTOF10: 4
PAINLEVEL_OUTOF10: 0
PAINLEVEL: 1=MINIMAL PAIN
PAINLEVEL_OUTOF10: 0

## 2018-01-01 ASSESSMENT — SOCIAL DETERMINANTS OF HEALTH (SDOH): ACTIVE STRESSOR - NO STRESS FACTORS: 1

## 2018-01-01 ASSESSMENT — PATIENT HEALTH QUESTIONNAIRE - PHQ9
1. LITTLE INTEREST OR PLEASURE IN DOING THINGS: NOT AT ALL
SUM OF ALL RESPONSES TO PHQ9 QUESTIONS 1 AND 2: 0
1. LITTLE INTEREST OR PLEASURE IN DOING THINGS: NOT AT ALL
2. FEELING DOWN, DEPRESSED, IRRITABLE, OR HOPELESS: NOT AT ALL
1. LITTLE INTEREST OR PLEASURE IN DOING THINGS: NOT AT ALL
SUM OF ALL RESPONSES TO PHQ9 QUESTIONS 1 AND 2: 0
2. FEELING DOWN, DEPRESSED, IRRITABLE, OR HOPELESS: 00
1. LITTLE INTEREST OR PLEASURE IN DOING THINGS: 00
SUM OF ALL RESPONSES TO PHQ9 QUESTIONS 1 AND 2: 0
2. FEELING DOWN, DEPRESSED, IRRITABLE, OR HOPELESS: NOT AT ALL
1. LITTLE INTEREST OR PLEASURE IN DOING THINGS: NOT AT ALL
SUM OF ALL RESPONSES TO PHQ9 QUESTIONS 1 AND 2: 0

## 2018-01-01 ASSESSMENT — LIFESTYLE VARIABLES
ALCOHOL_USE: NO
DO YOU DRINK ALCOHOL: NO
EVER_SMOKED: NEVER
EVER_SMOKED: NEVER

## 2018-01-03 NOTE — PROGRESS NOTES
"Subjective:  Ansley is a 89 y.o. female with the following   Past Medical History:   Diagnosis Date   • HTN (hypertension)     No family history on file.  The patient is on the following medications:   Current Outpatient Prescriptions:   •  levofloxacin (LEVAQUIN) 500 MG tablet, Take 1 Tab by mouth every day., Disp: 7 Tab, Rfl: 0  •  benzonatate (TESSALON) 200 MG capsule, Take 1 Cap by mouth 3 times a day as needed for Cough., Disp: 60 Cap, Rfl: 0  •  vitamin E (VITAMIN E) 1000 UNIT Cap, Take 1 Cap by mouth every day., Disp: , Rfl:   •  vitamin D (CHOLECALCIFEROL) 1000 UNIT Tab, Take 1,000 Units by mouth every day., Disp: , Rfl:   •  Calcium Citrate-Vitamin D (CALCIUM + D PO), Take  by mouth., Disp: , Rfl:   •  Multiple Vitamin (MULTI VITAMIN DAILY PO), Take  by mouth., Disp: , Rfl:   •  RESTASIS 0.05 % ophthalmic emulsion, INSTILL 1 DROP INTO BOTH EYES TWICE A DAY, Disp: , Rfl: 11  •  losartan-hydrochlorothiazide (HYZAAR) 50-12.5 MG per tablet, Take 1 Tab by mouth every day., Disp: , Rfl:   •  ibuprofen (MOTRIN) 200 MG Tab, Take 200 mg by mouth every 6 hours as needed., Disp: , Rfl:   •  oseltamivir (TAMIFLU) 30 MG Cap, Take 1 Cap by mouth every 12 hours for 5 days., Disp: 10 Cap, Rfl: 0    HPI; 89-year-old female patient is here today for follow-up visit regarding her recent  respiratory infection, already had Tamiflu for influenza infection and currently on levofloxacin for possible bacterial infection. Per patient her symptoms are gradually improving but continues with intermittent cough, denies having shortness of breath, fever or chills..          ROS: All other systems reviewed and they are negative.    Blood pressure 160/72, pulse 66, temperature 36.9 °C (98.5 °F), resp. rate 20, height 1.6 m (5' 3\"), weight 53.3 kg (117 lb 9.6 oz), SpO2 92 %, not currently breastfeeding.on RA        Objective:      Patient is well appearing and in no acute distress.  Eyes; pupils are equally reactive to light and " accommodation. Ears are clear, no tympanic membranes bulging. Pharynx is clear.  Neck is soft and supple, nontender,no thyromegaly or mass.  lymphatic;  no cervical or supraclavicular lymphadenopathy.  Respiratory;  Lungs scattered rhonchi to auscultation bilaterally with normal respiratory effort. Gastrointestinal; abdomen is soft, non-tender on palpation,not distended. Cardiovascular ; Heart regular rate and rhythm without murmur, no JVD.  Extremities without any clubbing, cyanosis, or edema. Neuro - psychiatric ;  alert and oriented to time, location and person, motor and sensory intact. Skin; no rash or erythema. Musculoskeletal; no tenderness on palpation, no joint swelling or erythema    Assessment ;   1. Bronchitis/ influenza infection; status post Tamiflu therapy, currently on levofloxacin antibiotic therapy. Pulse ox 92% on room air, scattered rhonchi to auscultation, not in apparent distress      Plan; Patient is advised on preventive and supportive care regarding current symptoms, use of antitussive as needed, continue with completion of antibiotic therapy, discussed alternative therapies including probiotics, If symptoms not improved or worsen return for evaluation.    Please note that this dictation was created using voice recognition software. I have worked with consultants from the vendor as well as technical experts from [facility] [employer] to optimize the interface. I have made every reasonable attempt to correct obvious errors, but I expect that there are errors of grammar and possibly content that I did not discover before finalizing the note.

## 2018-01-27 NOTE — TELEPHONE ENCOUNTER
ESTABLISHED PATIENT PRE-VISIT PLANNING     Note: Patient will not be contacted if there is no indication to call.     1.  Reviewed notes from the last few office visits within the medical group: Yes    2.  If any orders were placed at last visit or intended to be done for this visit (i.e. 6 mos follow-up), do we have Results/Consult Notes?        •  Labs - Labs were not ordered at last office visit.   Note: If patient appointment is for lab review and patient did not complete labs, check with provider if OK to reschedule patient until labs completed.       •  Imaging - Imaging ordered, completed and results are in chart.       •  Referrals - Referral ordered, patient was seen and consult notes are in chart. Care Teams updated  NO.    3. Is this appointment scheduled as a Hospital Follow-Up? No    4.  Immunizations were updated in Epic using WebIZ?: No WebIZ record       •  Web Iz Recommendations: UNKNOWN    5.  Patient is due for the following Health Maintenance Topics:   Health Maintenance Due   Topic Date Due   • Annual Wellness Visit  03/11/1920       - Patient declines Immunizations: PREVNAR (PCV13) , TDAP and ZOSTAVAX (Shingles).    6.  Patient was NOT informed to arrive 15 min prior to their scheduled appointment and bring in their medication bottles.

## 2018-01-30 NOTE — PROGRESS NOTES
"Subjective:      Ansley Vazquez is a 89 y.o. female who presents with Follow-Up (dizziness)            HPI     Patient is here accompanied by her daughter because of complaint of dizziness. She has been having intermittent dizziness happening different times of the day sometimes before meals sometimes after meals lasting for about 30 minutes almost every day with associated frontal headache. She denies feeling faint as if she is going to pass out. She has not had syncopal episode. She has not had a fall because of the dizziness. No trouble with her balance. According to the daughter she still has very good memory.        Past medical history, past surgical history, family history reviewed-no changes    Social history reviewed-no changes    Allergies reviewed-no changes    Medications reviewed-no changes    ROS     Review of systems as per history of present illness, the rest are negative.     Objective:     /70   Pulse 69   Temp 36.6 °C (97.8 °F)   Ht 1.6 m (5' 3\")   Wt 53.3 kg (117 lb 8.1 oz)   SpO2 93%   BMI 20.82 kg/m²      Physical Exam     Examined alert, awake, oriented, not in distress  HEENT-ATNC, positive PERRLA, EOMs intact, no facial droop, tongue midline, tympanic membranes intact bilaterally without evidence of infection, tongue at midline, no tonsillar pharyngeal injection, no stool discharge, no obstruction  Neck-supple, no lymphadenopathy, no thyromegaly  Lungs-clear to auscultation, no rales, no wheezes  Heart-regular rate and rhythm, no murmur  Extremities-no edema, clubbing, cyanosis  Neuro exam-negative Romberg, motor strength 5/5 upper and lower extremities, DTRs 2+, sensation intact to light touch, cranial nerves intact            Assessment/Plan:     1. Dizziness  I will get some blood work to rule out metabolic or hematologic problems. Advised proper hydration because this may just be due to dehydration. Advised to eat 3 meals a day and not to skip meals because this " could be due to hypoglycemia if she is not eating well.  - TSH; Future  - COMP METABOLIC PANEL; Future  - CBC WITH DIFFERENTIAL; Future  - VITAMIN D,25 HYDROXY; Future  - VITAMIN B12; Future  - FOLATE; Future    2. Frontal headache  Same as #1.  - TSH; Future  - COMP METABOLIC PANEL; Future  - CBC WITH DIFFERENTIAL; Future  - VITAMIN D,25 HYDROXY; Future  - VITAMIN B12; Future  - FOLATE; Future      Please note that this dictation was created using voice recognition software. I have worked with consultants from the vendor as well as technical experts from [facility]  [employer] to optimize the interface. I have made every reasonable attempt to correct obvious errors, but I expect that there are errors of grammar and possibly content I did not discover before finalizing the note.

## 2018-03-06 PROBLEM — I10 ESSENTIAL HYPERTENSION: Status: ACTIVE | Noted: 2018-01-01

## 2018-03-07 NOTE — PROGRESS NOTES
"Subjective:      Ansley Vazquez is a 89 y.o. female who presents with Chest Pain (on and off x 4 days)            HPI     Patient is here complaining of pain in lower half of the sternum on and off for the last 4 days. Pain lasts for about 30 minutes at that time. Patient is noted with and without exertion. Pain is not worse with exertion. She denies any radiation down the arms or the neck or the jaw. Patient never smoked cigarettes. Patient is not a diabetic. Last lipid panel in 8/17 came back total cholesterol 173, triglycerides 163, HDL 52, LDL 88    She has been feeling cold in her hands for a long time now even if it is not in the cold months. We have done blood work last month to check TSH, vitamins B-12 and folic acid level which all came back within normal limits. CBC did not show any anemia. She has low vitamin D level which we have been currently replacing.    Lately she has been having problems with insomnia. She said she wakes up at around 1 AM and can not go back to sleep anymore.    Past medical history, past surgical history, family history reviewed-no changes    Social history reviewed-no changes    Allergies reviewed-no changes    Medications reviewed-no changes          ROS     As per history of present illness, the rest are negative.     Objective:     /70   Pulse 75   Temp 36.2 °C (97.2 °F)   Ht 1.6 m (5' 3\")   Wt 52.1 kg (114 lb 13.8 oz)   SpO2 93%   BMI 20.35 kg/m²      Physical Exam     Examined alert, awake, oriented, not in distress    Neck-supple, no lymphadenopathy, no thyromegaly  Lungs-clear to auscultation, no rales, no wheezes  Chest-slight tenderness on palpation of the lower half of the sternum  Heart-regular rate and rhythm, no murmur  Extremities-no edema, clubbing, cyanosis     EKG done in the office showed sinus rhythm rate of 66, slight prolongation of the NC interval, left ventricular hypertrophy otherwise no acute changes    Assessment/Plan:     1. Chest " pain, unspecified type  Atypical chest pain. EKG without any acute abnormalities. This could be musculoskeletal in nature. Reassurance given. May take Tylenol as needed. Warm compresses to the sternum. Advised rest.  - EKG - Clinic Performed    2. Essential hypertension  Previously controlled and today the systolic blood pressure slightly elevated. Monitor blood pressure at home and keep a record. Continue current dose of losartan/HCTZ. I will reevaluate in a month.    3. Insomnia, unspecified type  Advised Tylenol PM at bedtime.    4. Cold hands  We have ruled out thyroid disorder with normal TSH. Vitamins B-12 and folate levels were normal. Keep the hands warm by wearing gloves especially wintertime.       Please note that this dictation was created using voice recognition software. I have worked with consultants from the vendor as well as technical experts from [employer] to optimize the interface. I have made every reasonable attempt to correct obvious errors, but I expect that there are errors of grammar and possibly content I did not discover before finalizing the note.

## 2018-04-03 NOTE — PROGRESS NOTES
"Subjective:      Ansley Vazquez is a 89 y.o. female who presents with Hypertension (follow up) and Pain (left shoulder)            HPI     Patient is back for follow-up of her blood pressure and because of bilateral shoulder pain and decreased range of motion movement.    Her blood pressure is higher now than when I saw her a month ago. She has been feeling fatigued but she denies any more problems with chest pain. She denies any dizziness or lightheadedness, headache, palpitations, shortness of breath or leg edema. She takes losartan/HCTZ. She does not know the dose. She continues to get the prescription from her previous PCP.    She has been having pain and difficulty with range of movement in both shoulders worse on the left than the right causing her not able to sleep at night. She takes Tylenol PM for sleep and plain Tylenol during the day as needed for the pain. She has been doing warm compresses as well. She denies any trauma.    She has been taking vitamin D supplementation 4000 international units daily for vitamin D deficiency. When we did her blood work in January 2018, she had slightly low WBC count and elevated lymphocytes. We have seen on an off mild neutropenia and lymphocytosis.    Past medical history, past surgical history, family history reviewed-no changes    Social history reviewed-no changes    Allergies reviewed-no changes    Medications reviewed-no changes    ROS     As per history of present illness, the rest are negative.       Objective:     /80   Pulse 69   Temp 36.4 °C (97.6 °F)   Ht 1.6 m (5' 3\")   Wt 51.5 kg (113 lb 8.6 oz)   SpO2 93%   BMI 20.11 kg/m²      Physical Exam     Examined alert, awake, oriented, not in distress    Neck-supple, no lymphadenopathy, no thyromegaly  Lungs-clear to auscultation, no rales, no wheezes  Heart-regular rate and rhythm, no murmur  Extremities-no edema, clubbing, cyanosis, positive slight swelling of the shoulder joint with " effusion without any erythema of the overlying skin, slightly decreased range of movement on forward elevation and abduction about 170° but no limitation on internal and external rotation right shoulder, left shoulder abduction 90°, left shoulder forward elevation 170°, very limited internal and external rotation left shoulder            Assessment/Plan:     1. Essential hypertension  Daughter will email me the dose of the losartan/HCTZ so we can increase to get the blood pressure better controlled. I will reevaluate in a month.    2. Impingement syndrome of left shoulder  We will get x-ray of both shoulders. Further recommendation will depend on results. Continue warm compresses and Tylenol when necessary.  - DX-SHOULDER 2+ LEFT; Future    3. Impingement syndrome of shoulder, right  Same as #2.  - DX-SHOULDER 2+ RIGHT; Future    4. Vitamin D deficiency  We will recheck vitamin D level to see if we have adequately replaced it. This may cause the fatigue.  - VITAMIN D,25 HYDROXY; Future    5. Neutropenia, unspecified type (CMS-HCC)  I will do a follow-up CBC. If persistent consider referral to hematology.  - CBC WITH DIFFERENTIAL; Future      Please note that this dictation was created using voice recognition software. I have worked with consultants from the vendor as well as technical experts from [facility]  [employer] to optimize the interface. I have made every reasonable attempt to correct obvious errors, but I expect that there are errors of grammar and possibly content I did not discover before finalizing the note.

## 2018-04-10 NOTE — PROGRESS NOTES
"Subjective:      Ansley Vazquez is a 89 y.o. female who presents with Pain (shoulder left)            HPI     Patient returns accompanied by her daughter to have shoulder injection done. She has pain and impingement of both shoulders worse on the left than the right. She has not been able to sleep well because of the pain. She does not have any appetite and so she has lost more weight since last visit. Daughter has been giving her supplement which is boost. X-ray of the shoulder showed moderate osteoarthritis of the left acromioclavicular and glenohumeral jointsand severe osteoarthritis of the right acromioclavicular joint and moderate osteoarthritis of the right acromioclavicular joint.patient has stopped doing household chores because of the pain. She said she has not been able to cook which has always done in the past.    Past medical history, past surgical history, family history reviewed-no changes    Social history reviewed-no changes    Allergies reviewed-no changes    Medications reviewed-no changes    ROS     As per history of present illness, the rest are negative.       Objective:     /60   Pulse 70   Temp 36.7 °C (98.1 °F)   Ht 1.6 m (5' 3\")   Wt 50.5 kg (111 lb 5.3 oz)   SpO2 92%   BMI 19.72 kg/m²      Physical Exam     Examined alert, awake, oriented, not in distress    Shoulder exam-positive impingement with significantly decreased range of motionon abduction, forward elevation, internal and external rotation worse on the left than the rightshoulder                Assessment/Plan:     1. Impingement syndrome of both shoulders  Landmarks were identified. Areas cleansed with Betadine sticks ×3 both subacromial area right and left shoulders. I injected into each subacromial space 4 cc of 2% lidocaine,3 cc of 0.5% Marcaine and 2 cc of 40 mg per mL Kenalog. Patient tolerated procedure well. Postprocedure instructions given. She will keep her regular follow-up appointment for next " month.      Please note that this dictation was created using voice recognition software. I have worked with consultants from the vendor as well as technical experts from [facility] to optimize the interface. I have made every reasonable attempt to correct obvious errors, but I expect that there are errors of grammar and possibly content I did not discover before finalizing the note.

## 2018-04-17 PROBLEM — N17.9 AKI (ACUTE KIDNEY INJURY) (HCC): Status: ACTIVE | Noted: 2018-01-01

## 2018-04-17 PROBLEM — E11.9 DIABETES MELLITUS, NEW ONSET (HCC): Status: ACTIVE | Noted: 2018-01-01

## 2018-04-17 PROBLEM — E87.1 HYPONATREMIA: Status: ACTIVE | Noted: 2018-01-01

## 2018-04-17 NOTE — PROGRESS NOTES
1. Attempt #: 1    2. HealthConnect Verified: yes    3. Verify PCP: yes    4. Care Team Updated:       •   DME Company (gait device, O2, CPAP, etc.): YES       •   Other Specialists (eye doctor, derm, GYN, cardiology, endo, etc): YES    5.  Reviewed/Updated the following with patient:       •   Communication Preference Obtained? YES       •   Preferred Pharmacy? YES       •   Preferred Lab? YES       •   Family History (document living status of immediate family members and if + hx of cancer, diabetes, hypertension, hyperlipidemia, heart attack, stroke) NO    6. [employer] Activation: already active    7. [employer] Keena: no    8. Annual Wellness Visit Scheduling  Scheduling Status:Scheduled      9. Care Gap Scheduling (Attempt to Schedule EACH Overdue Care Gap!)     Health Maintenance Due   Topic Date Due   • Annual Wellness Visit  03/11/1920        Scheduled patient for Annual Wellness Visit    10. Patient was advised: “This is a free wellness visit. The provider will screen for medical conditions to help you stay healthy. If you have other concerns to address you may be asked to discuss these at a separate visit or there may be an additional fee.”     11. Patient was informed to arrive 15 min prior to their scheduled appointment and bring in their medication bottles.

## 2018-04-18 NOTE — PROGRESS NOTES
Received report earlier,resting in bed alert and oriented x4,no pain verbalized very pleasant.Discussed POC and verbalized understanding,daughter at bedside.Instructed to call for any needs call light with in reach.

## 2018-04-18 NOTE — ASSESSMENT & PLAN NOTE
Prerenal resolved with fluids, stop fluids and repeat bmp in am.  Follow cmp shows improvement in creatinine  Continue Holding hydrochlorothiazide and losartan  Avoid nephtrotoxic drugs

## 2018-04-18 NOTE — CARE PLAN
Problem: Safety  Goal: Will remain free from injury  Outcome: PROGRESSING AS EXPECTED  Bed in low and locked position.  Side rails up X2.  Call light remains in reach and patient uses appropriately.  Family member at bedside to help with translation.  Hourly rounding continues.    Problem: Venous Thromboembolism (VTW)/Deep Vein Thrombosis (DVT) Prevention:  Goal: Patient will participate in Venous Thrombosis (VTE)/Deep Vein Thrombosis (DVT)Prevention Measures  Outcome: PROGRESSING AS EXPECTED   04/17/18 4802   OTHER   Risk Assessment Score 3   VTE RISK High   Pharmacologic Prophylaxis Used Unfractionated Heparin

## 2018-04-18 NOTE — DIETARY
"Nutrition services: Day 1 of admit.  Ansley Vazquez is a 89 y.o. female with admitting DX of Diabetes  Nutrition Admit Triggers for poor oral intake and weight loss prior to admit.  Nutrition Consult for diabetic education.    Assessment:  Height: 160 cm (5' 3\")  Weight: 48.8 kg (107 lb 9.4 oz)  Body mass index is 19.06 kg/m².  Diet Rx:  Diabetic    Evaluation:   1. Patient stated that her usual body weight is 125 lbs.  She states that she has lost ~15 lbs in the past week.  This represents a (10.7% weight loss = severe).  2. PO intake:  Patient states she took in one pancake, the fruit and coffee for breakfast.  3. Pertinent Labs:  Glucose 268  4. Pertinent Medications:  Lantus, SSI  5. Patient given handouts on carbohydrate counting, portion sizes, label reading, and sample menu.  States that she would share the information with her daughter.  6. Skin:  No skin breakdown noted.    Recommendations/Plan:  1. Nutrition Services to work with patient for food preferences.  2. RD to monitor PO intake.      "

## 2018-04-18 NOTE — PROGRESS NOTES
[facility] Hospitalist Progress Note    Date of Service: 2018    Chief Complaint  89 y.o. female admitted 2018 with new onset diabetes.    Interval Problem Update  Blood sugars down and 300 range now.  She is feeling better polydipsia and polyuria have resolved.    Consultants/Specialty  None    Disposition  To be determined        Review of Systems   Constitutional: Negative.  Negative for chills, fever, malaise/fatigue and weight loss.   HENT: Negative.    Respiratory: Negative.  Negative for cough and shortness of breath.    Cardiovascular: Negative.  Negative for chest pain and leg swelling.   Gastrointestinal: Negative.  Negative for abdominal pain, nausea and vomiting.   Genitourinary: Negative.  Negative for dysuria and flank pain.   Musculoskeletal: Negative.  Negative for back pain and myalgias.   Neurological: Negative.  Negative for dizziness, loss of consciousness and weakness.   Endo/Heme/Allergies: Negative.  Negative for polydipsia. Does not bruise/bleed easily.   Psychiatric/Behavioral: Negative.  Negative for depression. The patient is not nervous/anxious.    All other systems reviewed and are negative.     Physical Exam  Laboratory/Imaging   Hemodynamics  Temp (24hrs), Av.8 °C (98.2 °F), Min:36.4 °C (97.6 °F), Max:37.3 °C (99.1 °F)   Temperature: 36.8 °C (98.2 °F)  Pulse  Av.5  Min: 57  Max: 78    Blood Pressure : 131/70      Respiratory      Respiration: 18, Pulse Oximetry: 100 %, O2 Daily Delivery Respiratory : Nasal Cannula        RUL Breath Sounds: Clear, RML Breath Sounds: Clear;Diminished, RLL Breath Sounds: Clear;Diminished, SUNITA Breath Sounds: Clear, LLL Breath Sounds: Clear;Diminished    Fluids    Intake/Output Summary (Last 24 hours) at 18 1423  Last data filed at 18 0915   Gross per 24 hour   Intake             1144 ml   Output             1050 ml   Net               94 ml       Nutrition  Orders Placed This Encounter   Procedures   • Diet Order     Standing  Status:   Standing     Number of Occurrences:   1     Order Specific Question:   Diet:     Answer:   Diabetic [3]     Physical Exam   Constitutional: She appears well-developed and well-nourished. No distress.   HENT:   Nose: Nose normal.   Mouth/Throat: Oropharynx is clear and moist. No oropharyngeal exudate.   Eyes: Conjunctivae are normal. Right eye exhibits no discharge. Left eye exhibits no discharge. No scleral icterus.   Neck: No JVD present. No tracheal deviation present.   Cardiovascular: Normal rate, regular rhythm and normal heart sounds.    Pulmonary/Chest: Effort normal and breath sounds normal. No stridor. No respiratory distress. She has no wheezes. She has no rales. She exhibits no tenderness.   Abdominal: Soft. Bowel sounds are normal. She exhibits no distension. There is no tenderness.   Musculoskeletal: She exhibits no edema or tenderness.   Neurological: She is alert. No cranial nerve deficit. She exhibits normal muscle tone.   Skin: Skin is warm and dry. She is not diaphoretic. No pallor.   Psychiatric: She has a normal mood and affect. Her behavior is normal.   Nursing note and vitals reviewed.      Recent Labs      04/17/18 2112 04/18/18   0340   WBC  7.0  6.2   RBC  4.30  3.87*   HEMOGLOBIN  13.6  12.3   HEMATOCRIT  38.8  35.2*   MCV  90.2  91.0   MCH  31.6  31.8   MCHC  35.1*  34.9   RDW  38.1  38.5   PLATELETCT  153*  135*   MPV  11.5  11.5     Recent Labs      04/17/18 2112 04/18/18   0340  04/18/18   0917   SODIUM  124*  132*  133*   POTASSIUM  4.9  4.2  4.3   CHLORIDE  96  106  106   CO2  21  22  21   GLUCOSE  619*  363*  268*   BUN  79*  62*  49*   CREATININE  1.31  1.14  0.99   CALCIUM  9.1  8.7  8.9                      Assessment/Plan     Hyponatremia- (present on admission)   Assessment & Plan    Improving with treatment of hyperglycemia and hypovolemia. Continue to follow BMP.          Diabetes mellitus, new onset (CMS-HCC)- (present on admission)   Assessment & Plan    A1c:  14  On lantus and SSI  Titrate as needed increase to high-dose sliding scale.  Diabetes education for administration of insulin with family teaching.        RUT (acute kidney injury) (CMS-HCC)- (present on admission)   Assessment & Plan    Likely prerenal improving with fluids.  Continued iVF  Follow cmp shows improvement in creatinine  Holding hydrochlorothiazide and losartan  Avoid nephtrotoxic drugs        Essential hypertension- (present on admission)   Assessment & Plan    Holding losartan and hctz due to RUT  Started on amlodipine  Titrate meds as needed, blood pressure in good range today 131/70.          Quality-Core Measures   Reviewed items::  Labs reviewed, Medications reviewed and Radiology images reviewed  Ward catheter::  No Ward  DVT prophylaxis pharmacological::  Heparin

## 2018-04-18 NOTE — PROGRESS NOTES
Med rec updated and complete  Allergies reviewed  Interviewed pt with daughter at bedside with permission from pt.  Pts daughter reports no antibiotics in the last 30 days.

## 2018-04-18 NOTE — ASSESSMENT & PLAN NOTE
Holding losartan and hctz due to RUT, blood pressures in the 120s we'll continue to hold these and continue the amlodipine  Titrate meds as needed, blood pressure remains in good range

## 2018-04-18 NOTE — ED NOTES
Xavier from Lab called with critical glucose of 619 at 2156. Critical lab result read back to Xavier.   Dr. Emery notified of critical lab result at 2156.  Critical lab result read back by Dr. Emery.

## 2018-04-18 NOTE — H&P
Hospital Medicine History and Physical      Date of Service  4/17/2018    Chief Complaint  Chief Complaint   Patient presents with   • High Blood Sugar     Pt has been having weight loss, polydipsia and polyuria for past few days. Was seen by PCP and blood sugar was in the 500's, sent to Ed. No hx DM. Finger stick in Triage too high for glucometer to read        History of Presenting Illness  George is a 89 y.o. female PMH of HTN, who presents with above. Per patient she Has been having excessive thirst, polyuria, polydipsia. She went to see her primary care and her blood sugar was found to have around 500 so she was sent here to the ER for further evaluation and management. Never diagnosed with diabetes in the past. She will be admitted to the floor for further evaluation and management.    Primary Care Physician  Vy Singh M.D.      Code Status  Full code    Review of Systems  Review of Systems   Constitutional: Negative for chills, fever and weight loss.   HENT: Negative for congestion and nosebleeds.    Eyes: Negative for blurred vision, pain, discharge and redness.   Respiratory: Negative for cough, sputum production, shortness of breath and stridor.    Cardiovascular: Negative for chest pain, palpitations and orthopnea.   Gastrointestinal: Negative for abdominal pain, diarrhea, heartburn, nausea and vomiting.   Genitourinary: Negative for dysuria, frequency and urgency.   Musculoskeletal: Negative for back pain, myalgias and neck pain.   Skin: Negative for itching and rash.   Neurological: Negative for dizziness, focal weakness, seizures and headaches.   Endo/Heme/Allergies: Positive for polydipsia.   Psychiatric/Behavioral: Negative for depression. The patient is not nervous/anxious and does not have insomnia.      Please see HPI, all other systems were reviewed and are negative (AMA/CMS criteria)     Past Medical History  Past Medical History:   Diagnosis Date   • HTN (hypertension)        Surgical  History  Past Surgical History:   Procedure Laterality Date   • APPENDECTOMY     • ARTHROSCOPY, KNEE Bilateral    • CATARACT EXTRACTION WITH IOL Bilateral    • SALPINGECTOMY      right        Medications  No current facility-administered medications on file prior to encounter.      Current Outpatient Prescriptions on File Prior to Encounter   Medication Sig Dispense Refill   • vitamin E (VITAMIN E) 1000 UNIT Cap Take 1 Cap by mouth every day.     • vitamin D (CHOLECALCIFEROL) 1000 UNIT Tab Take 4,000 Units by mouth every day.     • Calcium Citrate-Vitamin D (CALCIUM + D PO) Take  by mouth.     • Multiple Vitamin (MULTI VITAMIN DAILY PO) Take  by mouth.     • RESTASIS 0.05 % ophthalmic emulsion INSTILL 1 DROP INTO BOTH EYES TWICE A DAY  11   • losartan-hydrochlorothiazide (HYZAAR) 50-12.5 MG per tablet Take 1 Tab by mouth every day.       Family History  History reviewed. No pertinent family history.      Social History  Social History   Substance Use Topics   • Smoking status: Never Smoker   • Smokeless tobacco: Never Used   • Alcohol use No       Allergies  Allergies   Allergen Reactions   • Aspirin Unspecified     Bloody stools   • Penicillins Hives   • Voltaren [Diclofenac]         Physical Exam  Laboratory   Hemodynamics  Temp (24hrs), Av.3 °C (99.1 °F), Min:37.3 °C (99.1 °F), Max:37.3 °C (99.1 °F)   Temperature: 37.3 °C (99.1 °F)  Pulse  Av  Min: 76  Max: 76    Blood Pressure : 152/66      Respiratory      Respiration: 18, Pulse Oximetry: 95 %             Physical Exam   Constitutional: She is oriented to person, place, and time. No distress.   HENT:   Head: Normocephalic and atraumatic.   Mouth/Throat: Oropharynx is clear and moist.   Eyes: Conjunctivae and EOM are normal. Pupils are equal, round, and reactive to light.   Neck: Normal range of motion. Neck supple. No tracheal deviation present. No thyromegaly present.   Cardiovascular: Normal rate and regular rhythm.    No murmur  heard.  Pulmonary/Chest: Effort normal and breath sounds normal. No respiratory distress. She has no wheezes.   Abdominal: Soft. Bowel sounds are normal. She exhibits no distension. There is no tenderness.   Musculoskeletal: She exhibits no edema or tenderness.   Neurological: She is alert and oriented to person, place, and time. No cranial nerve deficit.   Skin: Skin is warm and dry. She is not diaphoretic. No erythema.   Psychiatric: She has a normal mood and affect. Her behavior is normal. Thought content normal.       Recent Labs      04/17/18   2112   WBC  7.0   RBC  4.30   HEMOGLOBIN  13.6   HEMATOCRIT  38.8   MCV  90.2   MCH  31.6   MCHC  35.1*   RDW  38.1   PLATELETCT  153*   MPV  11.5     Recent Labs      04/17/18   0640   SODIUM  128*   POTASSIUM  4.5   CHLORIDE  95*   CO2  22   GLUCOSE  530*   BUN  74*   CREATININE  1.49*   CALCIUM  10.0     Recent Labs      04/17/18   0640   ALTSGPT  41   ASTSGOT  20   ALKPHOSPHAT  71   TBILIRUBIN  1.4   GLUCOSE  530*                 No results found for: TROPONINI    Imaging  No orders to display          Assessment/Plan     I anticipate this patient will require at least two midnights for appropriate medical management, necessitating inpatient admission.    Hyponatremia- (present on admission)   Assessment & Plan    Pseudohyponatremia from hyperglycemia          Diabetes mellitus, new onset (CMS-HCC)- (present on admission)   Assessment & Plan    A1c: 14  On lantus and SSI  Titrate as needed  Diet and exercise education        RUT (acute kidney injury) (CMS-[facility])- (present on admission)   Assessment & Plan    Likely prerenal  On iVF  Follow cmp in am  Holding meds as above  Avoid nephtrotoxic drugs        Essential hypertension- (present on admission)   Assessment & Plan    Holding losartan and hctz due to RUT  Started on amlodipine  Titrate meds as needed            Prophylaxis:  sc heparin

## 2018-04-18 NOTE — ASSESSMENT & PLAN NOTE
A1c: 14  On lantus and SSI  Titrate as needed increase to high-dose sliding scale.    Diabetes education for administration of insulin with family teaching.  Discussed with nurse and the family at the bedside. I wrote prescriptions and sent them to her pharmacy for a glucometer, long-acting insulin, short acting insulin, Lantus that and pen needles. Not going to discharge today as family is still getting comfortable using their new glucometer and supplies. Nursing is continuing to work with the patient and family and continuing education.  Discussed with pharmacy, due to her renal impairment she is not a candidate for oral medications.  Recommended follow-up with her primary care provider, perhaps she can be weaned off of sliding scale and maintain on only long-acting insulin.

## 2018-04-18 NOTE — ED NOTES
"Chief Complaint   Patient presents with   • High Blood Sugar     Pt has been having weight loss, polydipsia and polyuria for past few days. Was seen by PCP and blood sugar was in the 500's, sent to Ed. No hx DM. Finger stick in Triage too high for glucometer to read    /66   Pulse 76   Temp 37.3 °C (99.1 °F)   Resp 18   Ht 1.6 m (5' 3\")   Wt 48.8 kg (107 lb 9.4 oz)   SpO2 95%   BMI 19.06 kg/m²     "

## 2018-04-18 NOTE — TELEPHONE ENCOUNTER
After hours phone call 4/17/2018 at 1810 from the lab Re: Ansley Vazquez [phone] (home)  who reports PCP Vy Singh M.D..  Lab reports: Critical labs glucose > 500, Na 128, BUN 78.   Plan: I have reviewed these labs. I have called the only phone number on file for the patient and asked for them to call back and recommended ER. I will attempt to call again later.   Tatiana Olivo M.D.

## 2018-04-18 NOTE — PROGRESS NOTES
"Patient arrived to floor via cart from ED at 2230.  Patient able to walk from the hallway to the room and bathroom with handheld assist.  Family arrived with patient and remains in room with patient.  Patient speaks limited English and family helping with translation.  Patient also states \"if they cannot stay then I will leave\".  Patient is cooperative and pleasant.    "

## 2018-04-18 NOTE — ED PROVIDER NOTES
ED Provider Note    CHIEF COMPLAINT  Chief Complaint   Patient presents with   • High Blood Sugar     Pt has been having weight loss, polydipsia and polyuria for past few days. Was seen by PCP and blood sugar was in the 500's, sent to Ed. No hx DM. Finger stick in Triage too high for glucometer to read        HPI  Ansley Vazquez is a 89 y.o. female who presents after being found to be hyperglycemic and primary doctor's office.  Her family states the patient has been complaining of general weakness, thirst, and frequent urination over the past 1 month.  The generalized weakness has presented more over the past 1 week.  On check up with her doctor today, blood sugar was read as greater than 600, the patient referred to the emergency department.  No history of diabetes.  No fever.  No headache or neck stiffness.  She denies chest pain or shortness of breath.  No acute numbness or weakness.  No trauma    REVIEW OF SYSTEMS    Constitutional: Generalized weakness, no fever  Respiratory: No shortness of breath  Cardiac: No chest pain  Gastrointestinal: No vomiting  Musculoskeletal: No acute neck or back pain  Neurologic: No headache  Endocrine: High blood sugar       All other systems are negative.       PAST MEDICAL HISTORY  Past Medical History:   Diagnosis Date   • HTN (hypertension)        FAMILY HISTORY  History reviewed. No pertinent family history.    SOCIAL HISTORY  Social History     Social History   • Marital status:      Spouse name: N/A   • Number of children: N/A   • Years of education: N/A     Social History Main Topics   • Smoking status: Never Smoker   • Smokeless tobacco: Never Used   • Alcohol use No   • Drug use: No   • Sexual activity: Not Currently     Partners: Male     Other Topics Concern   • Not on file     Social History Narrative   • No narrative on file       SURGICAL HISTORY  Past Surgical History:   Procedure Laterality Date   • APPENDECTOMY     • ARTHROSCOPY, KNEE Bilateral   "  • CATARACT EXTRACTION WITH IOL Bilateral    • SALPINGECTOMY      right        CURRENT MEDICATIONS  Home Medications    **Home medications have not yet been reviewed for this encounter**         ALLERGIES  Allergies   Allergen Reactions   • Aspirin Unspecified     Bloody stools   • Penicillins Hives   • Voltaren [Diclofenac]        PHYSICAL EXAM  VITAL SIGNS: /66   Pulse 76   Temp 37.3 °C (99.1 °F)   Resp 18   Ht 1.6 m (5' 3\")   Wt 48.8 kg (107 lb 9.4 oz)   SpO2 95%   BMI 19.06 kg/m²   Constitutional:  Non-toxic appearance.   HENT: No facial swelling.  Mucous membranes are dry  Eyes: Anicteric, no conjunctivitis.     Cardiovascular: Normal heart rate, Normal rhythm  Pulmonary:  No wheezing, No rales.   Gastrointestinal: Soft, No tenderness, No guarding.  Bowel sounds normal  Skin: Warm, Dry, No cyanosis.  No peripheral edema  Neurologic: Speech is clear, follows commands, facial expression is symmetrical.   strength equal.  Psychiatric: Affect normal,  Mood normal.  Patient is calm and cooperative  Musculoskeletal: Chest wall nontender    EKG/Labs  Results for orders placed or performed during the hospital encounter of 04/17/18   CBC WITH DIFFERENTIAL   Result Value Ref Range    WBC 7.0 4.8 - 10.8 K/uL    RBC 4.30 4.20 - 5.40 M/uL    Hemoglobin 13.6 12.0 - 16.0 g/dL    Hematocrit 38.8 37.0 - 47.0 %    MCV 90.2 81.4 - 97.8 fL    MCH 31.6 27.0 - 33.0 pg    MCHC 35.1 (H) 33.6 - 35.0 g/dL    RDW 38.1 35.9 - 50.0 fL    Platelet Count 153 (L) 164 - 446 K/uL    MPV 11.5 9.0 - 12.9 fL    Neutrophils-Polys 82.50 (H) 44.00 - 72.00 %    Lymphocytes 12.00 (L) 22.00 - 41.00 %    Monocytes 3.40 0.00 - 13.40 %    Eosinophils 0.00 0.00 - 6.90 %    Basophils 0.10 0.00 - 1.80 %    Immature Granulocytes 2.00 (H) 0.00 - 0.90 %    Nucleated RBC 0.00 /100 WBC    Neutrophils (Absolute) 5.76 2.00 - 7.15 K/uL    Lymphs (Absolute) 0.84 (L) 1.00 - 4.80 K/uL    Monos (Absolute) 0.24 0.00 - 0.85 K/uL    Eos (Absolute) 0.00 " 0.00 - 0.51 K/uL    Baso (Absolute) 0.01 0.00 - 0.12 K/uL    Immature Granulocytes (abs) 0.14 (H) 0.00 - 0.11 K/uL    NRBC (Absolute) 0.00 K/uL   ACCU-CHEK GLUCOSE   Result Value Ref Range    Glucose - Accu-Ck >600 (HH) 65 - 99 mg/dL             COURSE & MEDICAL DECISION MAKING  Pertinent Labs & Imaging studies reviewed. (See chart for details)  Patient started out with IV fluids, 1 L normal saline bolus with evidence of dehydration on exam.  Suspect dehydration secondary to hyperglycemia causing polyuria.  Etiology of the new onset hyperglycemia is unknown, she'll be admitted to hospital for ongoing workup and medical stabilization    FINAL IMPRESSION     1. Hyperglycemia    2. Dehydration                    Electronically signed by: Alphonse Emery, 4/17/2018 9:45 PM

## 2018-04-19 NOTE — FACE TO FACE
Face to Face Supporting Documentation - Home Health    The encounter with this patient was in whole or in part the primary reason for home health admission.    Date of encounter:   Patient:                    MRN:                       YOB: 2018  Ansley Vazquez  [id number]  3/11/1920     Home health to see patient for:  Skilled Nursing care for assessment, interventions & education    Skilled need for:  New Onset Medical Diagnosis insulin dependent diabetes    Skilled nursing interventions to include:  Comment: diabetic education, medication management, skilled nursing    Homebound status evidenced by:  Needs the assistance of another person in order to leave the home. Leaving home requires a considerable and taxing effort. There is a normal inability to leave the home.    Community Physician to provide follow up care: Vy Singh M.D.     Optional Interventions? No      I certify the face to face encounter for this home health care referral meets the CMS requirements and the encounter/clinical assessment with the patient was, in whole, or in part, for the medical condition(s) listed above, which is the primary reason for home health care. Based on my clinical findings: the service(s) are medically necessary, support the need for home health care, and the homebound criteria are met.  I certify that this patient has had a face to face encounter by myself.  Leena Vines M.D. - NPI: [id number]

## 2018-04-19 NOTE — PROGRESS NOTES
Received report from night RN.  Assumed pt care.  Pt resting comfortably.  Updated pt and family w/ POC.  Will continue with care.

## 2018-04-19 NOTE — DISCHARGE PLANNING
Medical Social Work     SW intern gave patient choice form for Home Health. Pt chose [facility]. Forms sent to [facility] for referral follow up.     This note has been reviewed by Alexa GILMORE

## 2018-04-19 NOTE — PROGRESS NOTES
[facility] Hospitalist Progress Note    Date of Service: 2018    Chief Complaint  89 y.o. female admitted 2018 with new onset diabetes.    Interval Problem Update  Doing well family at bedside, education given.    I spent a total of 40 minutes during this clinical encounter of which > 50% was devoted to counseling and coordinating care including review of records, pertinent lab data and studies, as well as discussing diagnostic evaluation and work up, planned therapeutic interventions and future disposition of care. Where indicated, the assessment and plan reflect discussion of patient with consultants, other healthcare providers, family members, and additional research needed to obtain further information in formulating the plan of care of this patient.       Consultants/Specialty  None    Disposition  To be determined        Review of Systems   Constitutional: Negative.  Negative for chills, fever, malaise/fatigue and weight loss.   HENT: Negative.    Respiratory: Negative.  Negative for cough and shortness of breath.    Cardiovascular: Negative.  Negative for chest pain and leg swelling.   Gastrointestinal: Negative.  Negative for abdominal pain, nausea and vomiting.   Genitourinary: Negative.  Negative for dysuria and flank pain.   Musculoskeletal: Negative.  Negative for back pain and myalgias.   Neurological: Negative.  Negative for dizziness, loss of consciousness and weakness.   Endo/Heme/Allergies: Negative.  Negative for polydipsia. Does not bruise/bleed easily.   Psychiatric/Behavioral: Negative.  Negative for depression. The patient is not nervous/anxious.    All other systems reviewed and are negative.     Physical Exam  Laboratory/Imaging   Hemodynamics  Temp (24hrs), Av °C (98.6 °F), Min:36.9 °C (98.5 °F), Max:37 °C (98.6 °F)   Temperature: 37 °C (98.6 °F)  Pulse  Av.1  Min: 55  Max: 78    Blood Pressure : 114/55      Respiratory      Respiration: 18, Pulse Oximetry: 95 %        RUL  Breath Sounds: Clear, RML Breath Sounds: Diminished, RLL Breath Sounds: Diminished, SUNITA Breath Sounds: Clear, LLL Breath Sounds: Diminished    Fluids    Intake/Output Summary (Last 24 hours) at 04/19/18 1545  Last data filed at 04/19/18 0830   Gross per 24 hour   Intake             1556 ml   Output             1650 ml   Net              -94 ml       Nutrition  Orders Placed This Encounter   Procedures   • Diet Order     Standing Status:   Standing     Number of Occurrences:   1     Order Specific Question:   Diet:     Answer:   Diabetic [3]     Physical Exam   Constitutional: She appears well-developed and well-nourished. No distress.   HENT:   Nose: Nose normal.   Mouth/Throat: Oropharynx is clear and moist. No oropharyngeal exudate.   Eyes: Conjunctivae are normal. Right eye exhibits no discharge. Left eye exhibits no discharge. No scleral icterus.   Neck: No JVD present. No tracheal deviation present.   Cardiovascular: Normal rate, regular rhythm and normal heart sounds.    Pulmonary/Chest: Effort normal and breath sounds normal. No stridor. No respiratory distress. She has no wheezes. She has no rales. She exhibits no tenderness.   Abdominal: Soft. Bowel sounds are normal. She exhibits no distension. There is no tenderness.   Musculoskeletal: She exhibits no edema or tenderness.   Neurological: She is alert. No cranial nerve deficit. She exhibits normal muscle tone.   Skin: Skin is warm and dry. She is not diaphoretic. No pallor.   Psychiatric: She has a normal mood and affect. Her behavior is normal.   Nursing note and vitals reviewed.      Recent Labs      04/17/18   2112  04/18/18   0340   WBC  7.0  6.2   RBC  4.30  3.87*   HEMOGLOBIN  13.6  12.3   HEMATOCRIT  38.8  35.2*   MCV  90.2  91.0   MCH  31.6  31.8   MCHC  35.1*  34.9   RDW  38.1  38.5   PLATELETCT  153*  135*   MPV  11.5  11.5     Recent Labs      04/18/18   0340  04/18/18   0917  04/19/18   0843   SODIUM  132*  133*  136   POTASSIUM  4.2  4.3  4.0    CHLORIDE  106  106  111   CO2  22  21  20   GLUCOSE  363*  268*  107*   BUN  62*  49*  27*   CREATININE  1.14  0.99  0.95   CALCIUM  8.7  8.9  9.3                      Assessment/Plan     Hyponatremia- (present on admission)   Assessment & Plan    Resolved with stopping hydrochlorothiazide and volume resuscitation.          Diabetes mellitus, new onset (CMS-[facility])- (present on admission)   Assessment & Plan    A1c: 14  On lantus and SSI  Titrate as needed increase to high-dose sliding scale.  Diabetes education for administration of insulin with family teaching.  Discussed with nurse and the family at the bedside. I wrote prescriptions and sent them to her pharmacy for a glucometer, long-acting insulin, short acting insulin, Lantus that and pen needles. The family is going to fill these and bring them in for the nurse to do teaching with her actual supplies.  I anticipate she'll be able to go home tomorrow.  Discussed with pharmacy, due to her renal impairment she is not a candidate for oral medications.  Recommended follow-up with her primary care provider, perhaps she can be weaned off of sliding scale and maintain on only long-acting insulin.        RUT (acute kidney injury) (CMS-HCC)- (present on admission)   Assessment & Plan    Prerenal resolved with fluids, stop fluids and repeat bmp in am.  Follow cmp shows improvement in creatinine  Continue Holding hydrochlorothiazide and losartan  Avoid nephtrotoxic drugs        Essential hypertension- (present on admission)   Assessment & Plan    Holding losartan and hctz due to RUT, blood pressures in the 120s we'll continue to hold these and continue the amlodipine  Titrate meds as needed, blood pressure remains in good range           Quality-Core Measures   Reviewed items::  Labs reviewed, Medications reviewed and Radiology images reviewed  Ward catheter::  No Ward  DVT prophylaxis pharmacological::  Heparin

## 2018-04-19 NOTE — DISCHARGE PLANNING
Care Transition Team Assessment    SW intern met with patient at bedside. Pt plans to discharge home with daughter when medically able. Pt reports she uses a cane and walker at home. Pt has no questions or concerns at this time.     Information Source  Orientation : Oriented x 4  Information Given By: Patient  Informant's Name: Ansley  Who is responsible for making decisions for patient? : Patient    Readmission Evaluation  Is this a readmission?: No    Elopement Risk  Legal Hold: No  Ambulatory or Self Mobile in Wheelchair: Yes  Disoriented: No  Psychiatric Symptoms: None  History of Wandering: No  Elopement this Admit: No  Vocalizing Wanting to Leave: No  Displays Behaviors, Body Language Wanting to Leave: No-Not at Risk for Elopement  Elopement Risk: Not at Risk for Elopement    Interdisciplinary Discharge Planning  Does Admitting Nurse Feel This Could be a Complex Discharge?: No  Primary Care Physician: Dr Vy Singh   Lives with - Patient's Self Care Capacity: Adult Children  Patient or legal guardian wants to designate a caregiver (see row info): Yes  Caregiver name: Reena  Caregiver relationship to patient: Daughter  Caregiver contact info: [phone]  Support Systems: Friends / Neighbors, Family Member(s)  Housing / Facility: [address]  Do You Take your Prescribed Medications Regularly: Yes  Able to Return to Previous ADL's: Yes  Mobility Issues: Yes  Prior Services: None  Patient Expects to be Discharged to:: Home  Assistance Needed: Yes  Durable Medical Equipment: Walker    Discharge Preparedness  What is your plan after discharge?: Home with help  What are your discharge supports?: Child  Prior Functional Level: Independent with Activities of Daily Living  Difficulity with ADLs: None  Difficulity with IADLs: None    Functional Assesment  Prior Functional Level: Independent with Activities of Daily Living    Finances  Financial Barriers to Discharge: No  Prescription Coverage: Yes    Vision /  Hearing Impairment  Vision Impairment : Yes  Right Eye Vision: Impaired, Wears Glasses  Left Eye Vision: Impaired, Wears Glasses  Hearing Impairment : No    Values / Beliefs / Concerns  Values / Beliefs Concerns : No    Advance Directive  Advance Directive?: None  Advance Directive offered?: AD Booklet refused    Domestic Abuse  Have you ever been the victim of abuse or violence?: No  Physical Abuse or Sexual Abuse: No  Verbal Abuse or Emotional Abuse: No  Possible Abuse Reported to:: Not Applicable    Psychological Assessment  History of Substance Abuse: None  History of Psychiatric Problems: No  Non-compliant with Treatment: No  Newly Diagnosed Illness: No    Discharge Risks or Barriers  Discharge risks or barriers?: No    Anticipated Discharge Information  Anticipated discharge disposition: Home  Discharge Address: [address] [place] [address] [place] [address]  Discharge Contact Phone Number: [phone]    This note has been reviewed by Alexa GILMORE

## 2018-04-19 NOTE — CARE PLAN
Problem: Communication  Goal: The ability to communicate needs accurately and effectively will improve    Intervention: Educate patient and significant other/support system about the plan of care, procedures, treatments, medications and allow for questions  Educated patient and daughter regarding poc for fsbs checks and insulin administration, allowing for questions and concerns to be verbalized      Problem: Pain Management  Goal: Pain level will decrease to patient's comfort goal  Patient with headache, Tylenol per MAR administered with relief   04/18/18 2120   OTHER   Nurse Pain Scale 0 - 10  4

## 2018-04-19 NOTE — PROGRESS NOTES
1900-Report received from day RN, pt with family bedside, luciano, denies any immediate needs, safety precautions in place, will continue rounding.

## 2018-04-19 NOTE — DISCHARGE PLANNING
ATTN: Case Management  RE: Referral for Home Health                We would like to take this opportunity to thank you for submitting a referral for your patient to continue care with [facility]. Our skilled team is dedicated to helping all patients recover and gain independence in the home setting.            As of 4/19/18, we have accepted the above patient into our service. A [facility] clinician will be out to see the patient within 48 hours to conduct our initial visit. If you have any questions or concerns regarding the patient’s transition to Home Health, please do not hesitate to contact us. We are open for referrals 7 days a week from 8AM to 5PM at [phone].      We look forward to collaborating with you,  [facility] Team

## 2018-04-20 NOTE — CARE PLAN
Problem: Safety  Goal: Will remain free from injury    Intervention: Provide assistance with mobility  Bed in low position, traded socks on, call light within reach. Pt instructed to call nurse for any further needs.         Problem: Knowledge Deficit  Goal: Knowledge of disease process/condition, treatment plan, diagnostic tests, and medications will improve    Intervention: Assess knowledge level of disease process/condition, treatment plan, diagnostic tests, and medications  POC discussed with pt, pt verbalized understanding.

## 2018-04-20 NOTE — PROGRESS NOTES
Received report from Laureano TITUS. Assumed care. This pt is AOx4,   denies pain, Patient and RN discussed plan of care including checking blood sugar, diabetes education: questions answered. Chart reviewed. Call light in place, fall precautions in place, patient educated on importance of calling for assistance. No additional needs at this time.

## 2018-04-20 NOTE — PROGRESS NOTES
[facility] Hospitalist Progress Note    Date of Service: 2018    Chief Complaint  89 y.o. female admitted 2018 with new onset diabetes.    Interval Problem Update  Doing well and feels better. Family learning to use glucometer and administer insulin but still not able to do everything on their own.    Consultants/Specialty  None    Disposition  To be determined        Review of Systems   Constitutional: Negative.  Negative for chills, fever, malaise/fatigue and weight loss.   HENT: Negative.    Respiratory: Negative.  Negative for cough and shortness of breath.    Cardiovascular: Negative.  Negative for chest pain and leg swelling.   Gastrointestinal: Negative.  Negative for abdominal pain, nausea and vomiting.   Genitourinary: Negative.  Negative for dysuria and flank pain.   Musculoskeletal: Negative.  Negative for back pain and myalgias.   Neurological: Negative.  Negative for dizziness, loss of consciousness and weakness.   Endo/Heme/Allergies: Negative.  Negative for polydipsia. Does not bruise/bleed easily.   Psychiatric/Behavioral: Negative.  Negative for depression. The patient is not nervous/anxious.    All other systems reviewed and are negative.     Physical Exam  Laboratory/Imaging   Hemodynamics  Temp (24hrs), Av.2 °C (98.9 °F), Min:36.9 °C (98.4 °F), Max:37.4 °C (99.3 °F)   Temperature: 36.9 °C (98.4 °F)  Pulse  Av.1  Min: 55  Max: 78    Blood Pressure : 134/60      Respiratory      Respiration: 16, Pulse Oximetry: 96 %        RUL Breath Sounds: Clear, RML Breath Sounds: Diminished, RLL Breath Sounds: Diminished, SUNITA Breath Sounds: Clear, LLL Breath Sounds: Diminished    Fluids    Intake/Output Summary (Last 24 hours) at 18 1532  Last data filed at 18 0500   Gross per 24 hour   Intake              400 ml   Output              300 ml   Net              100 ml       Nutrition  Orders Placed This Encounter   Procedures   • Diet Order     Standing Status:   Standing     Number of  Occurrences:   1     Order Specific Question:   Diet:     Answer:   Diabetic [3]     Physical Exam   Constitutional: She appears well-developed and well-nourished. No distress.   HENT:   Nose: Nose normal.   Mouth/Throat: Oropharynx is clear and moist. No oropharyngeal exudate.   Eyes: Conjunctivae are normal. Right eye exhibits no discharge. Left eye exhibits no discharge. No scleral icterus.   Neck: No JVD present. No tracheal deviation present.   Cardiovascular: Normal rate, regular rhythm and normal heart sounds.    Pulmonary/Chest: Effort normal and breath sounds normal. No stridor. No respiratory distress. She has no wheezes. She has no rales. She exhibits no tenderness.   Abdominal: Soft. Bowel sounds are normal. She exhibits no distension. There is no tenderness.   Musculoskeletal: She exhibits no edema or tenderness.   Neurological: She is alert. No cranial nerve deficit. She exhibits normal muscle tone.   Skin: Skin is warm and dry. She is not diaphoretic. No pallor.   Psychiatric: She has a normal mood and affect. Her behavior is normal.   Nursing note and vitals reviewed.      Recent Labs      04/17/18   2112  04/18/18   0340   WBC  7.0  6.2   RBC  4.30  3.87*   HEMOGLOBIN  13.6  12.3   HEMATOCRIT  38.8  35.2*   MCV  90.2  91.0   MCH  31.6  31.8   MCHC  35.1*  34.9   RDW  38.1  38.5   PLATELETCT  153*  135*   MPV  11.5  11.5     Recent Labs      04/18/18   0917  04/19/18   0843  04/20/18   0840   SODIUM  133*  136  135   POTASSIUM  4.3  4.0  3.9   CHLORIDE  106  111  107   CO2  21  20  20   GLUCOSE  268*  107*  77   BUN  49*  27*  23*   CREATININE  0.99  0.95  0.88   CALCIUM  8.9  9.3  9.6                      Assessment/Plan     Hyponatremia- (present on admission)   Assessment & Plan    Resolved with stopping hydrochlorothiazide and volume resuscitation.          Diabetes mellitus, new onset (CMS-HCC)- (present on admission)   Assessment & Plan    A1c: 14  On lantus and SSI  Titrate as needed increase  to high-dose sliding scale.    Diabetes education for administration of insulin with family teaching.  Discussed with nurse and the family at the bedside. I wrote prescriptions and sent them to her pharmacy for a glucometer, long-acting insulin, short acting insulin, Lantus that and pen needles. Not going to discharge today as family is still getting comfortable using their new glucometer and supplies. Nursing is continuing to work with the patient and family and continuing education.  Discussed with pharmacy, due to her renal impairment she is not a candidate for oral medications.  Recommended follow-up with her primary care provider, perhaps she can be weaned off of sliding scale and maintain on only long-acting insulin.        RUT (acute kidney injury) (CMS-HCC)- (present on admission)   Assessment & Plan    Prerenal resolved with fluids, stop fluids and repeat bmp in am.  Follow cmp shows improvement in creatinine  Continue Holding hydrochlorothiazide and losartan  Avoid nephtrotoxic drugs        Essential hypertension- (present on admission)   Assessment & Plan    Holding losartan and hctz due to RUT, blood pressures in the 120s we'll continue to hold these and continue the amlodipine  Titrate meds as needed, blood pressure remains in good range           Quality-Core Measures   Reviewed items::  Labs reviewed, Medications reviewed and Radiology images reviewed  Ward catheter::  No Ward  DVT prophylaxis pharmacological::  Heparin

## 2018-04-20 NOTE — PROGRESS NOTES
Provided patient and family member with diabetic information.  Pt verbalizes and demonstrates use of glucometer, insulin pen, and injection of insulin. Educational handouts provided.

## 2018-04-20 NOTE — DISCHARGE PLANNING
Medical Social Work     SW intern gave IMM letter to patient.     This note has been reviewed by Alexa GILMORE

## 2018-04-21 NOTE — PROGRESS NOTES
Discharging patient home per MD order.  Pt demonstrated understanding of discharge instructions, follow up appointments, home medications, prescriptions, and nursing care instructions.  Ambulating with assistance with FWW, voiding without difficulty, pain well controlled, tolerating oral medications, oxygen saturation greater than 90% , tolerating diet. Pt verbalized understanding of discharge instructions and educational handouts, all questions answered.  Pt discharged off unit with hospital escort at 1130

## 2018-04-21 NOTE — PROGRESS NOTES
2100 Patient's daughter demonstrated correct use of glucometer for FSBG and correct administration of Levemir insulin using patient's pre-filled insulin pen. Patient tolerated procedures well and patient's daughter, as her primary caregiver, reported feeling a little better about administering the insulin and using glucometer.

## 2018-04-21 NOTE — PROGRESS NOTES
Spoke with hospitalist ARIELA Marshall and reviewed discharge instructions regarding short acting and long acting parameters before discharge. Sliding scale clarified and ordered for discharge on short acting. Long acting reviewed by hospitalist, pt to give Lantus 20 units instead of 30 units, and will hold medication under blood sugar of 100. Pt family member understood and feels comfortable. RN gave pt home health number for any other questions.

## 2018-04-21 NOTE — PROGRESS NOTES
Received report from Reagan. Patient reclining in bed, initially sleeping but woke easily. Daughter at bedside. Patient is alert, oriented, with clear speech although her primary language is not english, she is able to answer questions in English and understands more than she can speak. Daughter in the room to translate and she is her mother's primary caregiver. Patient reporting no other needs at this time. Will return shortly for medication administration and assessment.

## 2018-04-21 NOTE — CARE PLAN
Problem: Communication  Goal: The ability to communicate needs accurately and effectively will improve    Intervention: Use communication aids and/or /Language Line as appropriate  Patient has some ability to communicate in English; understands more than can speak. Daughter at bedside (primary caregiver) and speaks English.       Problem: Knowledge Deficit  Goal: Knowledge of the prescribed therapeutic regimen will improve  Outcome: PROGRESSING AS EXPECTED  Patient and daughter are actively participating in learning self-care regarding diabetes management.

## 2018-04-21 NOTE — DISCHARGE INSTRUCTIONS
Diabetes Mellitus and Food  It is important for you to manage your blood sugar (glucose) level. Your blood glucose level can be greatly affected by what you eat. Eating healthier foods in the appropriate amounts throughout the day at about the same time each day will help you control your blood glucose level. It can also help slow or prevent worsening of your diabetes mellitus. Healthy eating may even help you improve the level of your blood pressure and reach or maintain a healthy weight.  General recommendations for healthful eating and cooking habits include:  · Eating meals and snacks regularly. Avoid going long periods of time without eating to lose weight.  · Eating a diet that consists mainly of plant-based foods, such as fruits, vegetables, nuts, legumes, and whole grains.  · Using low-heat cooking methods, such as baking, instead of high-heat cooking methods, such as deep frying.  Work with your dietitian to make sure you understand how to use the Nutrition Facts information on food labels.  How can food affect me?  Carbohydrates   Carbohydrates affect your blood glucose level more than any other type of food. Your dietitian will help you determine how many carbohydrates to eat at each meal and teach you how to count carbohydrates. Counting carbohydrates is important to keep your blood glucose at a healthy level, especially if you are using insulin or taking certain medicines for diabetes mellitus.  Alcohol   Alcohol can cause sudden decreases in blood glucose (hypoglycemia), especially if you use insulin or take certain medicines for diabetes mellitus. Hypoglycemia can be a life-threatening condition. Symptoms of hypoglycemia (sleepiness, dizziness, and disorientation) are similar to symptoms of having too much alcohol.  If your health care provider has given you approval to drink alcohol, do so in moderation and use the following guidelines:  · Women should not have more than one drink per day, and men  should not have more than two drinks per day. One drink is equal to:  ¨ 12 oz of beer.  ¨ 5 oz of wine.  ¨ 1½ oz of hard liquor.  · Do not drink on an empty stomach.  · Keep yourself hydrated. Have water, diet soda, or unsweetened iced tea.  · Regular soda, juice, and other mixers might contain a lot of carbohydrates and should be counted.  What foods are not recommended?  As you make food choices, it is important to remember that all foods are not the same. Some foods have fewer nutrients per serving than other foods, even though they might have the same number of calories or carbohydrates. It is difficult to get your body what it needs when you eat foods with fewer nutrients. Examples of foods that you should avoid that are high in calories and carbohydrates but low in nutrients include:  · Trans fats (most processed foods list trans fats on the Nutrition Facts label).  · Regular soda.  · Juice.  · Candy.  · Sweets, such as cake, pie, doughnuts, and cookies.  · Fried foods.  What foods can I eat?  Eat nutrient-rich foods, which will nourish your body and keep you healthy. The food you should eat also will depend on several factors, including:  · The calories you need.  · The medicines you take.  · Your weight.  · Your blood glucose level.  · Your blood pressure level.  · Your cholesterol level.  You should eat a variety of foods, including:  · Protein.  ¨ Lean cuts of meat.  ¨ Proteins low in saturated fats, such as fish, egg whites, and beans. Avoid processed meats.  · Fruits and vegetables.  ¨ Fruits and vegetables that may help control blood glucose levels, such as apples, mangoes, and yams.  · Dairy products.  ¨ Choose fat-free or low-fat dairy products, such as milk, yogurt, and cheese.  · Grains, bread, pasta, and rice.  ¨ Choose whole grain products, such as multigrain bread, whole oats, and brown rice. These foods may help control blood pressure.  · Fats.  ¨ Foods containing healthful fats, such as nuts,  avocado, olive oil, canola oil, and fish.  Does everyone with diabetes mellitus have the same meal plan?  Because every person with diabetes mellitus is different, there is not one meal plan that works for everyone. It is very important that you meet with a dietitian who will help you create a meal plan that is just right for you.  This information is not intended to replace advice given to you by your health care provider. Make sure you discuss any questions you have with your health care provider.  Document Released: 09/14/2006 Document Revised: 05/25/2017 Document Reviewed: 11/14/2014  [employer] Interactive Patient Education © 2017 [employer] Inc.    Discharge Instructions    Discharged to home by car with relative. Discharged via wheelchair, hospital escort: Yes.  Special equipment needed: Walker    Be sure to schedule a follow-up appointment with your primary care doctor or any specialists as instructed.     Discharge Plan:   Diet Plan: Discussed  Activity Level: Discussed  Confirmed Follow up Appointment: Patient to Call and Schedule Appointment  Confirmed Symptoms Management: Discussed  Medication Reconciliation Updated: Yes  Influenza Vaccine Indication: Patient Refuses    I understand that a diet low in cholesterol, fat, and sodium is recommended for good health. Unless I have been given specific instructions below for another diet, I accept this instruction as my diet prescription.   Other diet: diabetic    Special Instructions: None    · Is patient discharged on Warfarin / Coumadin?   No     Depression / Suicide Risk    As you are discharged from this [facility], it is important to learn how to keep safe from harming yourself.    Recognize the warning signs:  · Abrupt changes in personality, positive or negative- including increase in energy   · Giving away possessions  · Change in eating patterns- significant weight changes-  positive or negative  · Change in sleeping patterns- unable to sleep  or sleeping all the time   · Unwillingness or inability to communicate  · Depression  · Unusual sadness, discouragement and loneliness  · Talk of wanting to die  · Neglect of personal appearance   · Rebelliousness- reckless behavior  · Withdrawal from people/activities they love  · Confusion- inability to concentrate     If you or a loved one observes any of these behaviors or has concerns about self-harm, here's what you can do:  · Talk about it- your feelings and reasons for harming yourself  · Remove any means that you might use to hurt yourself (examples: pills, rope, extension cords, firearm)  · Get professional help from the community (Mental Health, Substance Abuse, psychological counseling)  · Do not be alone:Call your Safe Contact- someone whom you trust who will be there for you.  · Call your local CRISIS HOTLINE 897-5087 or 955-461-0546  · Call your local Children's Mobile Crisis Response Team [place] (153) 703-7767 or www.KPA  · Call the toll free National Suicide Prevention Hotlines   · National Suicide Prevention Lifeline 594-315-SMWN (7970)  · National Hope Line Network 800-SUICIDE (123-3080)

## 2018-04-21 NOTE — PROGRESS NOTES
Received report from Katelyn ITTUS. Assumed care. This pt is AOx4, denies pain, Patient and RN discussed plan of care including home health and family management for checking blood sugars at home, using their won insulin pen, all questions answered. Chart reviewed. Call light in place, fall precautions in place, patient educated on importance of calling for assistance. No additional needs at this time.

## 2018-04-22 NOTE — DISCHARGE SUMMARY
CHIEF COMPLAINT ON ADMISSION  Chief Complaint   Patient presents with   • High Blood Sugar     Pt has been having weight loss, polydipsia and polyuria for past few days. Was seen by PCP and blood sugar was in the 500's, sent to Ed. No hx DM. Finger stick in Triage too high for glucometer to read        CODE STATUS  Prior    HPI & HOSPITAL COURSE  This is a 89 y.o. female here with a blood sugar of 530, polydipsia and polyphagia consistent with a new diagnosis of type II diabetes. She also presented with hyponatremia and acute kidney injury secondary to the severe hyperglycemia. Due to her advanced age and renal insufficiency the patient is a poor candidate for oral hypoglycemic agents. She did respond very well to insulin administration. Patient's family was educated on management of the insulin and given supplies including long-acting and short-acting insulin and glucometer. The bedside nurses provided as much education they could provide. Patient is referred to primary care and home health for further diabetic education. Discharge she felt well her polydipsia and polyphagia and polyuria had resolved.     The patient met 2-midnight criteria for an inpatient stay at the time of discharge.    Therefore, she is discharged in good and stable condition with close outpatient follow-up.    SPECIFIC OUTPATIENT FOLLOW-UP  Primary care and home health.    DISCHARGE PROBLEM LIST  Active Problems:    Essential hypertension POA: Yes    RUT (acute kidney injury) (CMS-[facility]) POA: Yes    Diabetes mellitus, new onset (CMS-[facility]) POA: Yes    Hyponatremia POA: Yes  Resolved Problems:    * No resolved hospital problems. *      FOLLOW UP  Future Appointments  Date Time Provider Department Center   4/24/2018 3:00 PM [facility] [place] None   6/21/2018 10:20 AM [facility] [place] None     Vy Singh M.D.  [address]  [place] [address]  [phone]    In 1 week        MEDICATIONS ON DISCHARGE   Ansley Vazquez  Angelina   Home Medication Instructions CHERIE:[id number]    Printed on:04/22/18 7644   Medication Information                      Calcium-Phosphorus-Vitamin D (CALCIUM GUMMIES PO)  Take 2 Tabs by mouth every day.             Cholecalciferol (VITAMIN D3) 5000 units Cap  Take 1 Cap by mouth every day.             FREESTYLE LANCETS Misc  1 Application by Does not apply route 4 Times a Day,Before Meals and at Bedtime.             insulin detemir (LEVEMIR) 100 UNIT/ML Solution Pen-injector injection  Inject 30 Units as instructed every evening.             Insulin Pen Needle 31G X 4 MM Misc  1 Application by Does not apply route 4 Times a Day,Before Meals and at Bedtime.             Insulin Regular Human, Conc, 500 UNIT/ML Solution Pen-injector  Inject 3-14 Units as instructed 4 Times a Day,Before Meals and at Bedtime.             losartan (COZAAR) 100 MG Tab  Take 100 mg by mouth every day.             Multiple Vitamin (MULTI VITAMIN DAILY PO)  Take 1 Tab by mouth every day.             RESTASIS 0.05 % ophthalmic emulsion  INSTILL 1 DROP INTO BOTH EYES TWICE A DAY             vitamin E (VITAMIN E) 1000 UNIT Cap  Take 1 Cap by mouth every day.                 DIET  No orders of the defined types were placed in this encounter.      ACTIVITY  As tolerated.  Weight bearing as tolerated      CONSULTATIONS  None.    PROCEDURES  None.    LABORATORY  Lab Results   Component Value Date/Time    SODIUM 132 (L) 04/21/2018 07:05 AM    POTASSIUM 3.9 04/21/2018 07:05 AM    CHLORIDE 106 04/21/2018 07:05 AM    CO2 19 (L) 04/21/2018 07:05 AM    GLUCOSE 110 (H) 04/21/2018 07:05 AM    BUN 20 04/21/2018 07:05 AM    CREATININE 0.95 04/21/2018 07:05 AM        Lab Results   Component Value Date/Time    WBC 6.2 04/18/2018 03:40 AM    HEMOGLOBIN 12.3 04/18/2018 03:40 AM    HEMATOCRIT 35.2 (L) 04/18/2018 03:40 AM    PLATELETCT 135 (L) 04/18/2018 03:40 AM        Total time of the discharge process exceeds 45 minutes

## 2018-04-23 NOTE — TELEPHONE ENCOUNTER
ESTABLISHED PATIENT PRE-VISIT PLANNING     Note: Patient will not be contacted if there is no indication to call.     1.  Reviewed notes from the last few office visits within the medical group: Yes    2.  If any orders were placed at last visit or intended to be done for this visit (i.e. 6 mos follow-up), do we have Results/Consult Notes?        •  Labs - Labs ordered, completed on 04/17/18 and results are in chart.   Note: If patient appointment is for lab review and patient did not complete labs, check with provider if OK to reschedule patient until labs completed.       •  Imaging - Imaging ordered, completed and results are in chart.       •  Referrals - No referrals were ordered at last office visit.    3. Is this appointment scheduled as a Hospital Follow-Up? No    4.  Immunizations were updated in Epic using WebIZ?: No WebIZ record       •  Web Iz Recommendations: UNKNOWN    5.  Patient is due for the following Health Maintenance Topics:   Health Maintenance Due   Topic Date Due   • Annual Wellness Visit  03[birth year]11/1920   • DIABETES MONOFILAMENT / LE EXAM  09/11/1920   • RETINAL SCREENING  [birth year]11/1938   • URINE ACR / MICROALBUMIN  04/23/2017       - Patient declines Immunizations: PREVNAR (PCV13)  and TDAP.    6.  MDX printed for Provider? NO    7.  Patient was NOT informed to arrive 15 min prior to their scheduled appointment and bring in their medication bottles.

## 2018-04-23 NOTE — TELEPHONE ENCOUNTER
Medications reviewed against discharge summary. No clinically significant interactions noted.     Celeste Wolf, PharmD

## 2018-04-23 NOTE — PROGRESS NOTES
Received referral from [facility] for med rec. Medications reviewed against discharge summary. No clinically significant interactions noted.     Celeste Wolf, BernardinoD

## 2018-04-24 NOTE — LETTER
[facility]  Vy Singh M.D.  [address]  [place] [address]  Fax: [phone]   Authorization for Release/Disclosure of   Protected Health Information   Name: ANSLEY DOMINGO : 3/11/1920 SSN: [id number]   Address: [facility] [address] [place] [address] Phone:    [phone] (home)    I authorize the entity listed below to release/disclose the PHI below to:   [facility]/Vy Singh M.D. and Vy Singh M.D.   Provider or Entity Name:  Dr. William Saeed    Address   [facility], [facility]   Phone:      Fax:     Reason for request: continuity of care   Information to be released:    [  ] LAST COLONOSCOPY,  including any PATH REPORT and follow-up  [  ] LAST FIT/COLOGUARD RESULT [  ] LAST DEXA  [  ] LAST MAMMOGRAM  [  ] LAST PAP  [  ] LAST LABS [ x ] RETINA EXAM REPORT  [  ] IMMUNIZATION RECORDS  [  ] Release all info      [  ] Check here and initial the line next to each item to release ALL health information INCLUDING  _____ Care and treatment for drug and / or alcohol abuse  _____ HIV testing, infection status, or AIDS  _____ Genetic Testing    DATES OF SERVICE OR TIME PERIOD TO BE DISCLOSED: _____________  I understand and acknowledge that:  * This Authorization may be revoked at any time by you in writing, except if your health information has already been used or disclosed.  * Your health information that will be used or disclosed as a result of you signing this authorization could be re-disclosed by the recipient. If this occurs, your re-disclosed health information may no longer be protected by State or Federal laws.  * You may refuse to sign this Authorization. Your refusal will not affect your ability to obtain treatment.  * This Authorization becomes effective upon signing and will  on (date) __________.      If no date is indicated, this Authorization will  one (1) year from the signature date.    Name: Ansley Domingo    Signature:   Date:     2018            PLEASE FAX REQUESTED RECORDS BACK TO: [phone]

## 2018-04-25 NOTE — TELEPHONE ENCOUNTER
Phone Number Called: [phone] (home)     Message: LVM to call back.     Left Message for patient to call back: yes

## 2018-04-25 NOTE — TELEPHONE ENCOUNTER
----- Message from Vy Singh M.D. sent at 4/25/2018 12:31 PM PDT -----  There is no evidence of pneumonia on x-ray.   Continue and finish the antibiotics. Most likely what she has is bronchitis.

## 2018-04-26 NOTE — PROGRESS NOTES
"Subjective:      Ansley Vazquez is a 89 y.o. female who presents with Cough (pt weezing ); Fatigue; and Fall            HPI     Patient is here for follow-up after recent hospitalization at [facility] from 4/17-21/18 for hyperglycemia, hyponatremia and acute kidney injury due to the hyperglycemia. Her blood sugar was in the 500s. On hospitalization BUN was 74 and creatinine was 1.49. Sodium was 128. She was ruled out for UTI. She was given IV hydration. She was started on Levemir and regular insulin. Her blood sugar improved. She was discharge on both Levemir and regular insulin. Her hemoglobin A1c was very high at 14.2. Since discharge, home blood sugars before breakfast have been running from 77 to 115. Blood sugars at bedtime are higher from 232-287. She still feels rundown and fatigued. She said just difficulty sleeping at night. She has been trying to observe diabetic diet. According to the daughter she has received diabetes education in the hospital.    Patient has been coughing for about 5 days now. When she is able to spit up the phlegm this is whitish in color. She denies any shortness of breath, fever, chills.    Past medical history, past surgical history, family history reviewed-no changes    Social history reviewed-no changes    Allergies reviewed-no changes    Medications reviewed-no changes    ROS     As per history of present illness, the rest are negative.       Objective:     /76   Pulse 76   Temp 36.6 °C (97.8 °F)   Resp 14   Ht 1.575 m (5' 2\")   Wt 46.7 kg (103 lb)   SpO2 92%   Breastfeeding? No   BMI 18.84 kg/m²      Physical Exam     Examined alert, awake, oriented, not in distress    Neck-supple, no lymphadenopathy, no thyromegaly  Lungs-positive rhonchi all lung fields, good air exchange, no rales  Heart-regular rate and rhythm, no murmur  Extremities-no edema, clubbing, cyanosis       I reviewed hospital records     Assessment/Plan:     1. New " onset type 2 diabetes mellitus (CMS-[facility])  Blood sugars have been running good in the morning below 150. Her blood sugars at bedtime are higher in the mid 200s to high 200s. Continue with Levemir 20 units daily and the regular insulin sliding scale coverage. Daughter has not been giving regular insulin at bedtime. She thought since the Levemir is given at bedtime that she does not need to give the regular insulin. Advised to give the regular insulin per sliding scale coverage with each meal and at bedtime. Patient has home health nurse that helps with monitoring her vital signs, administration of medication. We will do follow-up in a month. Advised to continue with diabetic diet. Daughter will communicate with me blood sugar readings.    2. Acute kidney injury (CMS-[facility])  This has resolved with IV hydration and correction of the hyperglycemia in the hospital.    3. Acute bronchitis, unspecified organism  Most likely she has acute bronchitis. I need to get a chest x-ray to rule out pneumonia because of the rhonchi all the lung fields. I will put her on Zithromax Z-STEVEN. Advised increase by mouth fluids and rest. I will communicate results of the x-ray.  - DX-CHEST-2 VIEWS; Future  - azithromycin (ZITHROMAX) 250 MG Tab; Take 2 tabs today then 1 tab daily for 4 days.  Dispense: 6 Tab; Refill: 0    Follow-up in a month.    Patient was seen for 40 minutes face to face of which > 50% of appointment time was spent on counseling and coordination of care regarding the above.    Please note that this dictation was created using voice recognition software. I have worked with consultants from the vendor as well as technical experts from [facility] to optimize the interface. I have made every reasonable attempt to correct obvious errors, but I expect that there are errors of grammar and possibly content I did not discover before finalizing the note.

## 2018-04-26 NOTE — TELEPHONE ENCOUNTER
I spoke with patient's daughter. 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 Per daughter the hospital is already talked to them about this finding and because of her advanced age they have decided that she does not need to go through any further testing. We want to do any further intervention at this time. Patient is asymptomatic without any abdominal pain.  For daughter blood sugars in the evening and at bedtime are less than 150. This morning her blood sugar was 63. She gave her a glass of juice and after 20 minutes the blood sugar went up to 93.   Instructed the daughter to send me the blood pressure readings in the next few days and if they continue to run low in the morning we may be able to decrease the dose of the Levemir to 10 units.

## 2018-04-30 NOTE — TELEPHONE ENCOUNTER
[facility] and asked when Ansley can Start her Vitamin D Power again if if you could order her a new insulin pen that can be adjusted to the correct dosage amount/

## 2018-05-22 NOTE — PROGRESS NOTES
"Subjective:      Ansley Vazquez is a 89 y.o. female who presents with Follow-Up          HPI     patient returns for follow-up and she is accompanied by her daughter.    We have decreased the dose of the Levemir to 10 units at night because blood sugars have been running low in the morning. We have been giving her sliding scale coverage with regular insulin only in the morning and at bedtime because lunchtime and dinnertime blood sugars have been running good. Blood sugars in the morning before breakfast have been running from 70-90 and at bedtime in the mid 150s to mid 250s.    Per patient she has no appetite. She denies abdominal pain, nausea, vomiting, diarrhea or constipation. She has been loosing weight and has lost about 12 pounds in 2 and half months. When she was in the hospital her abdominal ultrasound showed dilated pancreatic duct worrisome for stricture or mass. I have spoken to the daughter about this findings and she said the hospitalist talked to her about these findings when patient was in the hospital and daughter and patient decided not to proceed with any further workup because of her age. Today the patient looked very jaundiced. Patient and daughter however have not noticed this. Per patient her urine looks orange.    In terms of her hypertension, according to the daughter she is giving the patient only the losartan 50 mg a day instead of 100 mg a day because her blood pressures have been running low.    Past medical history, past surgical history, family history reviewed-no changes    Social history reviewed-no changes    Allergies reviewed-no changes    Medications reviewed-no changes              ROS     As per history of present illness, the rest are negative.       Objective:     /50   Pulse 78   Temp 36.6 °C (97.9 °F)   Ht 1.575 m (5' 2\")   Wt 46.6 kg (102 lb 11.8 oz)   SpO2 97%   BMI 18.79 kg/m²      Physical Exam     Examined alert, awake, oriented, not in " distress    Eyes-positive jaundice  Neck-supple, no lymphadenopathy, no thyromegaly  Lungs-clear to auscultation, no rales, no wheezes  Heart-regular rate and rhythm, no murmur  Abdomen-good bowel sounds, soft, nontender, no hepatosplenomegaly, no masses  Extremities-no edema, clubbing, cyanosis  Skin-positive jaundice            Assessment/Plan:     1. Pancreatic duct dilated  She has a new finding on exam which is jaundice. Abdominal ultrasound of month ago showed pancreatic duct dilatation probably from stricture or mass. Patient however without any abdominal pain, nausea, vomiting or other GI symptoms. We discussed doing further workup with CT of the pancreas. Patient states that if this requires surgical intervention or if this comes back suspicious for cancer she will not proceed with any further treatment. If she does have any problem which is thought due to cancer that can be corrected with less invasive procedure than it is worth doing the test and getting the procedure done. We will decide depending on the findings of the CAT scan. I will have her go and do a CBC and CMP as well to check for metabolic or hematologic problem especially the liver function.  - CBC WITH DIFFERENTIAL; Future  - COMP METABOLIC PANEL; Future  - CT-ABDOMEN WITH & W/O; Future    2. Jaundice  Plan the same as #1.  - CBC WITH DIFFERENTIAL; Future  - COMP METABOLIC PANEL; Future  - CT-ABDOMEN WITH & W/O; Future    3. Diabetes mellitus, new onset (HCC)  Blood sugars are running low 70-19 the morning even with the lower dose of Levemir. We will decrease the dose to 5 units at night. We will stop the short-acting insulin coverage. I will recheck in a month.  - CBC WITH DIFFERENTIAL; Future  - COMP METABOLIC PANEL; Future    4. Essential hypertension  Blood pressure is running good on half the dose of losartan 50 mg a day. We will keep her on the same dose.      Please note that this dictation was created using voice recognition software.  I have worked with consultants from the vendor as well as technical experts from [employer] to optimize the interface. I have made every reasonable attempt to correct obvious errors, but I expect that there are errors of grammar and possibly content I did not discover before finalizing the note.

## 2018-06-04 PROBLEM — R53.1 GENERALIZED WEAKNESS: Status: ACTIVE | Noted: 2018-01-01

## 2018-06-04 PROBLEM — K86.89 PANCREATIC MASS: Status: ACTIVE | Noted: 2018-01-01

## 2018-06-04 PROBLEM — E11.9 TYPE II DIABETES MELLITUS (HCC): Status: ACTIVE | Noted: 2018-01-01

## 2018-06-04 NOTE — ED PROVIDER NOTES
ED Provider Note    ED Provider Note    Primary care provider: Vy Singh M.D.  Means of arrival: POV  History obtained from: Patient's daughter and niece  History limited by: Patient condition    CHIEF COMPLAINT  Chief Complaint   Patient presents with   • Jaundice     5/22 CT shows dilated pancreatic duct.   • Sent by MD Dr. Singh   • Weakness     +decreased appetite       HPI  Ansley Vazquez is a 89 y.o. female who presents to the Emergency Department via private vehicle with her daughter and niece.  She presents with a chief complaint of weakness.  Normally, the patient has limited activity but she walks with a walker and is able to accomplish her activities of daily living such as getting to the bathroom or getting something to eat.  She does have a known history of pancreatic ductal dilatation.  She has been evaluated in her primary care doctor's office for jaundice and recently, admitted to the hospital for new onset diabetes.  Family states that symptoms have been present for 2 days and increasing.  She has been increasingly unsteady on her feet and her jaundice is worsening.  Last 2 days she has been unable to get out of bed or stand on her own.  The daughter and niece, are unable to care for her on their own.  They report that she sleeps all the time now.  And that she has no strength.  She has had a decrease in her appetite for about a month.  They are concerned that perhaps may be the instructions she got at her recent hospitalization regarding carbs that she is really been limiting herself to much in attempt to control her sugars and they think she is not eating enough.  They report blood sugars in the low 100s no significantly low blood sugars.  Sugars at night can sometimes be in the 200s.  She has had back pain for years which seems to be at its baseline.  She has not complained of abdominal pain.  She has had normal bowel movements.  She is taking milk of magnesia to keep herself  "regular.  They do report stools are lighter in color.    REVIEW OF SYSTEMS  Review of Systems   Unable to perform ROS: Medical condition   Constitutional: Negative for fever.   HENT: Negative for congestion.    Respiratory: Negative for shortness of breath.    Cardiovascular: Negative for chest pain.   Gastrointestinal: Positive for constipation. Negative for abdominal pain and vomiting.   Genitourinary: Negative for flank pain.   Musculoskeletal: Positive for back pain.   Skin: Positive for itching.   Neurological: Positive for weakness.   All other systems reviewed and are negative.      PAST MEDICAL HISTORY   has a past medical history of HTN (hypertension).    SURGICAL HISTORY   has a past surgical history that includes arthroscopy, knee (Bilateral); cataract extraction with iol (Bilateral); salpingectomy; and appendectomy.    SOCIAL HISTORY  Social History   Substance Use Topics   • Smoking status: Never Smoker   • Smokeless tobacco: Never Used   • Alcohol use No      History   Drug Use No       FAMILY HISTORY  History reviewed. No pertinent family history.    CURRENT MEDICATIONS  Home Medications     Reviewed by Ludy Mclean R.N. (Registered Nurse) on 06/04/18 at 1451  Med List Status: Complete   Medication Last Dose Status   Cholecalciferol (VITAMIN D3) 5000 units Cap 6/3/2018 Active   glycerin, adult, 2 GM suppository 6/4/2018 Active   insulin detemir (LEVEMIR) 100 UNIT/ML Solution 6/3/2018 Active   Liniments (SALONPAS PAIN RELIEF PATCH EX) > 4 days Active   losartan (COZAAR) 50 MG Tab > 3 days Active   magnesium hydroxide (MILK OF MAGNESIA) 400 MG/5ML Suspension > 5 days Active   Multiple Vitamin (MULTI VITAMIN DAILY PO) 6/3/2018 Active   RESTASIS 0.05 % ophthalmic emulsion 6/3/2018 Active   vitamin E (VITAMIN E) 1000 UNIT Cap > 3 days Active                ALLERGIES  Allergies   Allergen Reactions   • Aspirin Unspecified     \"Bloody stools\"   • Penicillins Hives     \"All over body\"   • Voltaren " "[Diclofenac] Unspecified     \"Bloody stools\"       PHYSICAL EXAM  VITAL SIGNS: /52   Pulse 84   Temp 37.1 °C (98.7 °F)   Resp 18   Ht 1.575 m (5' 2\")   Wt 47.7 kg (105 lb 2.6 oz)   SpO2 97%   BMI 19.23 kg/m²   Vitals reviewed.    Constitutional: Patient is oriented to person, place, and time. Frail appearing. No distress. Thin, but not cachectic  Head: Normocephalic and atraumatic.   Ears: Normal external ears bilaterally.   Mouth/Throat: Oropharynx is clear.  Juandice under tongue.  Eyes: Conjunctivae are icteric. Pupils are equal, round, and reactive to light.   Neck: Normal range of motion. Neck supple.  Cardiovascular: Normal rate, regular rhythm and normal heart sounds. Normal peripheral pulses.  Pulmonary/Chest: Effort normal and breath sounds normal. No respiratory distress, no wheezes, rhonchi, or rales.  Abdominal: Soft. Bowel sounds are normal. There is no tenderness. No rebound or guarding, or peritoneal signs.   Musculoskeletal: No edema and no tenderness.   Lymphadenopathy: No cervical adenopathy.   Neurological: No focal deficits. generalized weakness.  Skin: Skin is warm and dry. No erythema. No pallor.  Severely jaundiced  Psychiatric: Patient has a normal mood and affect.     LABS  Results for orders placed or performed during the hospital encounter of 06/04/18   CBC WITH DIFFERENTIAL   Result Value Ref Range    WBC 6.6 4.8 - 10.8 K/uL    RBC 3.49 (L) 4.20 - 5.40 M/uL    Hemoglobin 11.6 (L) 12.0 - 16.0 g/dL    Hematocrit 35.0 (L) 37.0 - 47.0 %    .3 (H) 81.4 - 97.8 fL    MCH 33.2 (H) 27.0 - 33.0 pg    MCHC 33.1 (L) 33.6 - 35.0 g/dL    RDW 74.2 (H) 35.9 - 50.0 fL    Platelet Count 295 164 - 446 K/uL    MPV 12.7 9.0 - 12.9 fL    Nucleated RBC 0.00 /100 WBC    NRBC (Absolute) 0.00 K/uL    Neutrophils-Polys 86.00 (H) 44.00 - 72.00 %    Lymphocytes 8.00 (L) 22.00 - 41.00 %    Monocytes 3.00 0.00 - 13.40 %    Eosinophils 0.00 0.00 - 6.90 %    Basophils 0.00 0.00 - 1.80 %    Neutrophils " (Absolute) 5.74 2.00 - 7.15 K/uL    Lymphs (Absolute) 0.53 (L) 1.00 - 4.80 K/uL    Monos (Absolute) 0.20 0.00 - 0.85 K/uL    Eos (Absolute) 0.00 0.00 - 0.51 K/uL    Baso (Absolute) 0.00 0.00 - 0.12 K/uL    Anisocytosis 1+     Macrocytosis 1+    COMP METABOLIC PANEL   Result Value Ref Range    Sodium 126 (L) 135 - 145 mmol/L    Potassium 4.2 3.6 - 5.5 mmol/L    Chloride 98 96 - 112 mmol/L    Co2 17 (L) 20 - 33 mmol/L    Anion Gap 11.0 0.0 - 11.9    Glucose 99 65 - 99 mg/dL    Bun 49 (H) 8 - 22 mg/dL    Creatinine 1.02 0.50 - 1.40 mg/dL    Calcium 9.3 8.4 - 10.2 mg/dL    AST(SGOT) 360 (H) 12 - 45 U/L    ALT(SGPT) 253 (H) 2 - 50 U/L    Alkaline Phosphatase 1219 (H) 30 - 99 U/L    Albumin 2.5 (L) 3.2 - 4.9 g/dL    Total Protein 6.1 6.0 - 8.2 g/dL    Globulin 3.6 (H) 1.9 - 3.5 g/dL    A-G Ratio 0.7 g/dL    Total Bilirubin 40.0 (H) 0.1 - 1.5 mg/dL   LIPASE   Result Value Ref Range    Lipase 24 7 - 58 U/L   PROTHROMBIN TIME   Result Value Ref Range    PT 17.2 (H) 12.0 - 14.6 sec    INR 1.42 (H) 0.87 - 1.13   TROPONIN   Result Value Ref Range    Troponin I 0.09 (H) 0.00 - 0.04 ng/mL   AMMONIA   Result Value Ref Range    Ammonia 40 11 - 45 umol/L   DIFFERENTIAL MANUAL   Result Value Ref Range    Bands-Stabs 1.00 0.00 - 10.00 %    Metamyelocytes 1.00 %    Myelocytes 1.00 %    Manual Diff Status PERFORMED    PLATELET ESTIMATE   Result Value Ref Range    Plt Estimation Normal    MORPHOLOGY   Result Value Ref Range    RBC Morphology Present     Giant Platelets 1+     Polychromia 1+    ESTIMATED GFR   Result Value Ref Range    GFR If African American >60 >60 mL/min/1.73 m 2    GFR If Non African American 50 (A) >60 mL/min/1.73 m 2   Magnesium   Result Value Ref Range    Magnesium 2.5 1.5 - 2.5 mg/dL   CBC with Differential   Result Value Ref Range    WBC 5.5 4.8 - 10.8 K/uL    RBC 3.11 (L) 4.20 - 5.40 M/uL    Hemoglobin 10.1 (L) 12.0 - 16.0 g/dL    Hematocrit 31.0 (L) 37.0 - 47.0 %    MCV 99.7 (H) 81.4 - 97.8 fL    MCH 32.5 27.0  - 33.0 pg    MCHC 32.6 (L) 33.6 - 35.0 g/dL    RDW 74.5 (H) 35.9 - 50.0 fL    Platelet Count 251 164 - 446 K/uL    MPV 11.5 9.0 - 12.9 fL    Nucleated RBC 0.00 /100 WBC    NRBC (Absolute) 0.00 K/uL    Neutrophils-Polys 90.00 (H) 44.00 - 72.00 %    Lymphocytes 2.00 (L) 22.00 - 41.00 %    Monocytes 2.00 0.00 - 13.40 %    Eosinophils 1.00 0.00 - 6.90 %    Basophils 2.00 (H) 0.00 - 1.80 %    Neutrophils (Absolute) 5.06 2.00 - 7.15 K/uL    Lymphs (Absolute) 0.11 (L) 1.00 - 4.80 K/uL    Monos (Absolute) 0.11 0.00 - 0.85 K/uL    Eos (Absolute) 0.06 0.00 - 0.51 K/uL    Baso (Absolute) 0.11 0.00 - 0.12 K/uL    Hypochromia 2+     Anisocytosis 2+     Macrocytosis 1+    Comp Metabolic Panel (CMP)   Result Value Ref Range    Sodium 131 (L) 135 - 145 mmol/L    Potassium 3.7 3.6 - 5.5 mmol/L    Chloride 108 96 - 112 mmol/L    Co2 16 (L) 20 - 33 mmol/L    Anion Gap 7.0 0.0 - 11.9    Glucose 149 (H) 65 - 99 mg/dL    Bun 39 (H) 8 - 22 mg/dL    Creatinine 1.63 (H) 0.50 - 1.40 mg/dL    Calcium 8.5 8.4 - 10.2 mg/dL    AST(SGOT) 270 (H) 12 - 45 U/L    ALT(SGPT) 208 (H) 2 - 50 U/L    Alkaline Phosphatase 985 (H) 30 - 99 U/L    Albumin 1.7 (L) 3.2 - 4.9 g/dL    Total Protein 4.9 (L) 6.0 - 8.2 g/dL    Globulin 3.2 1.9 - 3.5 g/dL    A-G Ratio 0.5 g/dL    Total Bilirubin 51.0 (H) 0.1 - 1.5 mg/dL   ESTIMATED GFR   Result Value Ref Range    GFR If African American 35 (A) >60 mL/min/1.73 m 2    GFR If Non  29 (A) >60 mL/min/1.73 m 2   DIFFERENTIAL MANUAL   Result Value Ref Range    Bands-Stabs 2.00 0.00 - 10.00 %    Metamyelocytes 1.00 %    Manual Diff Status PERFORMED    PLATELET ESTIMATE   Result Value Ref Range    Plt Estimation Normal    MORPHOLOGY   Result Value Ref Range    RBC Morphology Present     Polychromia 1+     Target Cells 2+    ACCU-CHEK GLUCOSE   Result Value Ref Range    Glucose - Accu-Ck 146 (H) 65 - 99 mg/dL   ACCU-CHEK GLUCOSE   Result Value Ref Range    Glucose - Accu-Ck 177 (H) 65 - 99 mg/dL   ACCU-CHEK  GLUCOSE   Result Value Ref Range    Glucose - Accu-Ck 143 (H) 65 - 99 mg/dL   ACCU-CHEK GLUCOSE   Result Value Ref Range    Glucose - Accu-Ck 167 (H) 65 - 99 mg/dL   ACCU-CHEK GLUCOSE   Result Value Ref Range    Glucose - Accu-Ck 178 (H) 65 - 99 mg/dL   ACCU-CHEK GLUCOSE   Result Value Ref Range    Glucose - Accu-Ck 171 (H) 65 - 99 mg/dL   ACCU-CHEK GLUCOSE   Result Value Ref Range    Glucose - Accu-Ck 158 (H) 65 - 99 mg/dL       All labs reviewed by me.      COURSE & MEDICAL DECISION MAKING  Pertinent Labs & Imaging studies reviewed. (See chart for details)    Obtained and reviewed past medical records. 5/22/2018    10:08 AM - Patient seen and examined at bedside.  This is a pleasant 89-year-old female who presents with her daughter and her niece.  Unfortunately, she is severely jaundiced, now presents with weakness up to worsening over the last few days.  She has a recent evaluation that showed a pancreatic mass and has not further had this evaluated due to not wanting to take aggressive action.  They do have a follow-up appointment with GI.  At this point, they cannot take care of her at home.  They are unsure how to proceed and at this point would just like to obtain further information regarding her condition before perhaps moving forward with palliative measures or more aggressive measures.    The differential diagnoses include but are not limited to: suspect cancer,  Dehydration, diabetes, UTI, ACS.     Patient was given IV fluids for low blood pressure to which she responded to well.  She does report feeling better after administered.    Patient is admitted to the hospitalist service.  I spoken with Dr. López he has spoken with GI on-call.  Will speak with the patient and family regarding moving forward with care.  However, at this time, she will need to be admitted because she is unable to be cared for at home.    She is admitted in stable but guarded    FINAL IMPRESSION  1. Pancreatic mass    2.  Jaundice    3. Generalized weakness

## 2018-06-04 NOTE — CONSULTS
Gastroenterology Consult Note:    Amador Aimee  Date & Time note created:    6/4/2018   2:04 PM     Patient ID:  Name:             Ansley Vazquez  YOB: 1920  Age:                 89 y.o.  female  MRN:               [id number]    Referring MD:  Dr. Allen                                                              Chief Complaint(s):      Pancreatic cancer with obstructive jaundice    History of Present Illness:    This is a very pleasant 89 y.o. female with the past medical history of Type II diabetes, hypertension presented 6/4/2018 for evaluation of generalized jaundice which family noticed about one month ago progressively worsening. Patient says that her appetite also been limited to where she can only food with size of her hand, denies any abdominal pain denies any nausea vomiting. Last month primary care ordered a CT abdomen and pelvis which unfortunately showed a 1.5 cm pancreatic mass. Family agrees and wants to proceed with hospice after discussion with hospitalist. I was consulted to weigh in.     On average, the patient has 1 BM a day, with frequent MoM every other day and glycerin supp. The patient does not take NSAIDs. Otherwise the patient is doing fine without complaints of fever/chills/dysphagia/odynophagia/heartburn/nausea/vomiting/bloating/diarrhea/melena/hematochezia or abdominal pain.    Review of Systems:      Constitutional: Denies fevers, weight changes  Eyes: Denies changes in vision, Pos jaundice  Ears/Nose/Throat/Mouth: Denies nasal congestion or sore throat   Cardiovascular: Denies chest pain or palpitations   Respiratory: Denies shortness of breath, denies cough  Gastrointestinal/Hepatic: Deneis abdominal pain, nausea, vomiting, diarrhea, or GI bleeding. Pos constipation.  Genitourinary: Denies dysuria or frequency  Musculoskeletal/Rheum: Denies  joint pain and swelling, edema  Skin: Denies rash  Neurological: Denies headache, confusion, memory loss or  focal weakness/parasthesias  Psychiatric: denies mood disorder   Endocrine: Lynne thyroid problems  Heme/Oncology/Lymph Nodes: Denies enlarged lymph nodes, denies brusing or known bleeding disorder  All other systems were reviewed and are negative (AMA/CMS criteria)              Past Medical History:   Past Medical History:   Diagnosis Date   • HTN (hypertension)      Active Hospital Problems    Diagnosis   • Pancreatic mass [K86.9]     Priority: High   • Generalized weakness [R53.1]   • Type II diabetes mellitus (HCC) [E11.9]   • Hyponatremia [E87.1]   • Essential hypertension [I10]       Past Surgical History:  Past Surgical History:   Procedure Laterality Date   • APPENDECTOMY     • ARTHROSCOPY, KNEE Bilateral    • CATARACT EXTRACTION WITH IOL Bilateral    • SALPINGECTOMY      right        Hospital Medications:  Current Facility-Administered Medications   Medication Dose Frequency Provider Last Rate Last Dose   • senna-docusate (PERICOLACE or SENOKOT S) 8.6-50 MG per tablet 2 Tab  2 Tab BID Rachael Allen M.D.        And   • polyethylene glycol/lytes (MIRALAX) PACKET 1 Packet  1 Packet QDAY PRN Rachael Allen M.D.        And   • magnesium hydroxide (MILK OF MAGNESIA) suspension 30 mL  30 mL QDAY PRN Rachael Allen M.D.        And   • bisacodyl (DULCOLAX) suppository 10 mg  10 mg QDAY PRN Rachael Allen M.D.       • NS infusion   Continuous Rachael Allen M.D.       • ondansetron (ZOFRAN) syringe/vial injection 4 mg  4 mg Q4HRS PRN Rachael Allen M.D.       • ondansetron (ZOFRAN ODT) dispertab 4 mg  4 mg Q4HRS PRN Rachael Allen M.D.       • acetaminophen (TYLENOL) tablet 650 mg  650 mg Q6HRS PRN Rachael Allen M.D.       • dronabinol (MARINOL) capsule 5 mg  5 mg BEFORE LUNCH AND DINNER Rachael Allen M.D.       • losartan (COZAAR) tablet 50 mg  50 mg DAILY Rachael Allen M.D.       • vitamin E 1,000 Units  1 Cap DAILY Rachael Allen M.D.       • artificial tears 1.4 %  "ophthalmic solution 1-2 Drop  1-2 Drop Q2HRS PRN Rachael Allen M.D.       • heparin injection 5,000 Units  5,000 Units Q8HRS Rachael Allen M.D.       • insulin regular (HUMULIN R) injection 1-6 Units  1-6 Units 4X/DAY TROY Allen M.D.        And   • glucose 4 g chewable tablet 16 g  16 g Q15 MIN PRN Rachael Allen M.D.        And   • dextrose 50% (D50W) injection 25 mL  25 mL Q15 MIN PRN Rachael Allen M.D.         Last reviewed on 6/4/2018 11:43 AM by Amanda Melendez    Current Outpatient Medications:  Prescriptions Prior to Admission   Medication Sig Dispense Refill Last Dose   • insulin detemir (LEVEMIR) 100 UNIT/ML Solution Inject 3-5 Units as instructed every evening. 3 units if blood sugar < 185  5 units if blood sugar > 185   6/3/2018 at 2100   • losartan (COZAAR) 50 MG Tab Take 50 mg by mouth every day.   > 3 days at AM   • glycerin, adult, 2 GM suppository Insert 1 Suppository in rectum 1 time daily as needed.   6/4/2018 at 0700   • magnesium hydroxide (MILK OF MAGNESIA) 400 MG/5ML Suspension Take 30 mL by mouth 1 time daily as needed (constipation).   > 5 days at PRN   • Liniments (SALONPAS PAIN RELIEF PATCH EX) Apply 1 Patch to affected area(s) 1 time daily as needed (pain).   > 4 days at PRN   • Cholecalciferol (VITAMIN D3) 5000 units Cap Take 1 Cap by mouth every day.   6/3/2018 at 1200   • vitamin E (VITAMIN E) 1000 UNIT Cap Take 1 Cap by mouth every day.   > 3 days at AM   • Multiple Vitamin (MULTI VITAMIN DAILY PO) Take 1 Tab by mouth every day.   6/3/2018 at 1200   • RESTASIS 0.05 % ophthalmic emulsion INSTILL 1 DROP INTO BOTH EYES TWICE A DAY  11 6/3/2018 at 2100       Medication Allergy:  Allergies   Allergen Reactions   • Aspirin Unspecified     \"Bloody stools\"   • Penicillins Hives     \"All over body\"   • Voltaren [Diclofenac] Unspecified     \"Bloody stools\"       Family History:  History reviewed. No pertinent family history.    Social History:  Social " "History     Social History   • Marital status:      Spouse name: N/A   • Number of children: N/A   • Years of education: N/A     Occupational History   • Not on file.     Social History Main Topics   • Smoking status: Never Smoker   • Smokeless tobacco: Never Used   • Alcohol use No   • Drug use: No   • Sexual activity: Not Currently     Partners: Male     Other Topics Concern   • Not on file     Social History Narrative   • No narrative on file       Physical Exam:  Weight/BMI: Body mass index is 19.23 kg/m².  Blood pressure 125/60, pulse 77, temperature 36.9 °C (98.5 °F), resp. rate 20, height 1.575 m (5' 2\"), weight 47.7 kg (105 lb 2.6 oz), SpO2 100 %.  Vitals:    06/04/18 1128 06/04/18 1158 06/04/18 1228 06/04/18 1326   BP:    125/60   Pulse: 78 71 75 77   Resp: (!) 26 20 15 20   Temp:    36.9 °C (98.5 °F)   SpO2: 93% 94% 93% 100%   Weight:    47.7 kg (105 lb 2.6 oz)   Height:         Oxygen Therapy:  Pulse Oximetry: 100 %, O2 (LPM): 0, O2 Delivery: None (Room Air)  No intake or output data in the 24 hours ending 06/04/18 1404    Constitutional:   Well developed, malnourished, chronic ill-looking  HEENT:  Normocephalic, Atraumatic, Conjunctiva not pale, Sclera icteric, Oropharynx moist mucous membranes, No oral exudates, Nose normal.  No thyromegaly.  Neck:  Normal range of motion, No cervical tenderness,  no JVD.  Chest/Lungs:  Symmetric expansion, no spider angioma, breath sounds clear to auscultation bilaterally,  no crackles, no wheezing.   Cardiovascular:  Normal heart rate, Normal rhythm, No murmurs, No rubs, No gallops.    Abdomen: Bowel sounds normal, Soft, No tenderness, No guarding, No rebound, No masses, No hepatosplenomegaly.  Extremities: No cyanosis/clubbing/edema/palmar erythema/flapping tremor  Skin: Warm, Dry, No erythema, No rash, no induration.    MDM (Data Review):     Records reviewed and summarized in current documentation    Lab Data Review:  Recent Results (from the past 24 " hour(s))   Magnesium    Collection Time: 06/04/18 10:00 AM   Result Value Ref Range    Magnesium 2.5 1.5 - 2.5 mg/dL   CBC WITH DIFFERENTIAL    Collection Time: 06/04/18 10:03 AM   Result Value Ref Range    WBC 6.6 4.8 - 10.8 K/uL    RBC 3.49 (L) 4.20 - 5.40 M/uL    Hemoglobin 11.6 (L) 12.0 - 16.0 g/dL    Hematocrit 35.0 (L) 37.0 - 47.0 %    .3 (H) 81.4 - 97.8 fL    MCH 33.2 (H) 27.0 - 33.0 pg    MCHC 33.1 (L) 33.6 - 35.0 g/dL    RDW 74.2 (H) 35.9 - 50.0 fL    Platelet Count 295 164 - 446 K/uL    MPV 12.7 9.0 - 12.9 fL    Nucleated RBC 0.00 /100 WBC    NRBC (Absolute) 0.00 K/uL    Neutrophils-Polys 86.00 (H) 44.00 - 72.00 %    Lymphocytes 8.00 (L) 22.00 - 41.00 %    Monocytes 3.00 0.00 - 13.40 %    Eosinophils 0.00 0.00 - 6.90 %    Basophils 0.00 0.00 - 1.80 %    Neutrophils (Absolute) 5.74 2.00 - 7.15 K/uL    Lymphs (Absolute) 0.53 (L) 1.00 - 4.80 K/uL    Monos (Absolute) 0.20 0.00 - 0.85 K/uL    Eos (Absolute) 0.00 0.00 - 0.51 K/uL    Baso (Absolute) 0.00 0.00 - 0.12 K/uL    Anisocytosis 1+     Macrocytosis 1+    COMP METABOLIC PANEL    Collection Time: 06/04/18 10:03 AM   Result Value Ref Range    Sodium 126 (L) 135 - 145 mmol/L    Potassium 4.2 3.6 - 5.5 mmol/L    Chloride 98 96 - 112 mmol/L    Co2 17 (L) 20 - 33 mmol/L    Anion Gap 11.0 0.0 - 11.9    Glucose 99 65 - 99 mg/dL    Bun 49 (H) 8 - 22 mg/dL    Creatinine 1.02 0.50 - 1.40 mg/dL    Calcium 9.3 8.4 - 10.2 mg/dL    AST(SGOT) 360 (H) 12 - 45 U/L    ALT(SGPT) 253 (H) 2 - 50 U/L    Alkaline Phosphatase 1219 (H) 30 - 99 U/L    Albumin 2.5 (L) 3.2 - 4.9 g/dL    Total Protein 6.1 6.0 - 8.2 g/dL    Globulin 3.6 (H) 1.9 - 3.5 g/dL    A-G Ratio 0.7 g/dL    Total Bilirubin 40.0 (H) 0.1 - 1.5 mg/dL   LIPASE    Collection Time: 06/04/18 10:03 AM   Result Value Ref Range    Lipase 24 7 - 58 U/L   PROTHROMBIN TIME    Collection Time: 06/04/18 10:03 AM   Result Value Ref Range    PT 17.2 (H) 12.0 - 14.6 sec    INR 1.42 (H) 0.87 - 1.13   TROPONIN    Collection  Time: 06/04/18 10:03 AM   Result Value Ref Range    Troponin I 0.09 (H) 0.00 - 0.04 ng/mL   AMMONIA    Collection Time: 06/04/18 10:03 AM   Result Value Ref Range    Ammonia 40 11 - 45 umol/L   DIFFERENTIAL MANUAL    Collection Time: 06/04/18 10:03 AM   Result Value Ref Range    Bands-Stabs 1.00 0.00 - 10.00 %    Metamyelocytes 1.00 %    Myelocytes 1.00 %    Manual Diff Status PERFORMED    PLATELET ESTIMATE    Collection Time: 06/04/18 10:03 AM   Result Value Ref Range    Plt Estimation Normal    MORPHOLOGY    Collection Time: 06/04/18 10:03 AM   Result Value Ref Range    RBC Morphology Present     Giant Platelets 1+     Polychromia 1+    ESTIMATED GFR    Collection Time: 06/04/18 10:03 AM   Result Value Ref Range    GFR If African American >60 >60 mL/min/1.73 m 2    GFR If Non African American 50 (A) >60 mL/min/1.73 m 2       MDM (Assessment and Plan):     Active Hospital Problems    Diagnosis   • Pancreatic mass [K86.9]     Priority: High   • Generalized weakness [R53.1]   • Type II diabetes mellitus (HCC) [E11.9]   • Hyponatremia [E87.1]   • Essential hypertension [I10]       Imaging/Procedures Review:    5/23/18 CT A/P:  Likely obstructing 1.1 cm mass in the pancreatic head causing severe dilatation of the pancreatic duct, common bile duct and intrahepatic bile ducts. Further evaluation with ERCP is recommended.  Distended gallbladder.  Hiatal hernia.    Assessment  - Pancreatic cancer  - Obstructive jaundice 2/2 pancreatic cancer  - DM  - Weakness  - Hypertension  - Hyponatremia    Plan  - Potentially the pt can possibly receive ERCP with biliary metallic stent, yet it still comes with risks of pancreatitic, bleeding etc. The family declined  - Agree with palliative care.   - Will sign off and stand by. Please contact us again if we can be of further assistance.     Thank you very much for allowing me to participate in the care of your patient.  Please feel free to contact me anytime at [phone].      Amador Yu M.D.    Core Quality Measures   Reviewed items::  Labs, Medications and Radiology reports reviewed

## 2018-06-04 NOTE — H&P
Hospital Medicine History and Physical    Date of Service  6/4/2018    Chief Complaint  Chief Complaint   Patient presents with   • Jaundice     5/22 CT shows dilated pancreatic duct.   • Sent by MD Dr. Singh   • Weakness     +decreased appetite       History of Presenting Illness  89 y.o. female with a past medical history ofType II diabetes, hypertension presented 6/4/2018 for evaluation of generalized jaundice which family noticed about one month ago progressively worsening. Patient says that her appetite also been limited to where she can only food with size of her hand, denies any abdominal pain denies any nausea vomiting. Last month primary care ordered a CT abdomen and pelvis which unfortunately showed a 1.5 cm pancreatic mass. At this point we had a long discussion with the patient's family about overall prognosis, irrespective further investigation would require biopsies and I believe that her health is not at the point where she would tolerate any type of procedures or chemotherapy periodically. Family agrees, he would like gastroenterology to weigh in her opinion as well for peace of mind.     Family agrees and wants to proceed with hospice.                  Primary Care Physician  Vy Singh M.D.    [facility]  [facility]    Code Status  Code: DNR    Review of Systems  Review of Systems   Constitutional: Positive for malaise/fatigue and weight loss. Negative for chills and fever.   HENT: Negative for congestion, hearing loss, sore throat and tinnitus.    Eyes: Negative for blurred vision, double vision, photophobia and pain.   Respiratory: Negative for cough, hemoptysis, sputum production, shortness of breath and stridor.    Cardiovascular: Negative for chest pain, palpitations, orthopnea, claudication and PND.   Gastrointestinal: Negative for abdominal pain, blood in stool, constipation, diarrhea, heartburn, melena, nausea and vomiting.   Genitourinary: Negative for dysuria, frequency and  "urgency.   Musculoskeletal: Negative for back pain, myalgias and neck pain.   Skin: Positive for itching and rash.   Neurological: Positive for weakness. Negative for dizziness, tingling, tremors, sensory change, speech change and headaches.   Psychiatric/Behavioral: Negative for depression, memory loss and suicidal ideas. The patient is not nervous/anxious.           Past Medical History  Past Medical History:   Diagnosis Date   • HTN (hypertension)        Surgical History  Past Surgical History:   Procedure Laterality Date   • APPENDECTOMY     • ARTHROSCOPY, KNEE Bilateral    • CATARACT EXTRACTION WITH IOL Bilateral    • SALPINGECTOMY      right        Medications  No current facility-administered medications on file prior to encounter.      Current Outpatient Prescriptions on File Prior to Encounter   Medication Sig Dispense Refill   • magnesium hydroxide (MILK OF MAGNESIA) 400 MG/5ML Suspension Take 30 mL by mouth 1 time daily as needed (constipation).     • Liniments (SALONPAS PAIN RELIEF PATCH EX) Apply 1 Patch to affected area(s) 1 time daily as needed (pain).     • Cholecalciferol (VITAMIN D3) 5000 units Cap Take 1 Cap by mouth every day.     • vitamin E (VITAMIN E) 1000 UNIT Cap Take 1 Cap by mouth every day.     • Multiple Vitamin (MULTI VITAMIN DAILY PO) Take 1 Tab by mouth every day.     • RESTASIS 0.05 % ophthalmic emulsion INSTILL 1 DROP INTO BOTH EYES TWICE A DAY  11       Family History  History reviewed. No pertinent family history.    Social History  Social History   Substance Use Topics   • Smoking status: Never Smoker   • Smokeless tobacco: Never Used   • Alcohol use No       Allergies  Allergies   Allergen Reactions   • Aspirin Unspecified     \"Bloody stools\"   • Penicillins Hives     \"All over body\"   • Voltaren [Diclofenac] Unspecified     \"Bloody stools\"        Physical Exam  Laboratory   Hemodynamics  Temp (24hrs), Av.7 °C (98.1 °F), Min:36.7 °C (98.1 °F), Max:36.7 °C (98.1 °F)   " Temperature: 36.7 °C (98.1 °F)  Pulse  Av.5  Min: 79  Max: 80 Heart Rate (Monitored): 78  Blood Pressure : (!) 99/51, NIBP: (!) 95/50      Respiratory      Respiration: (!) 26, Pulse Oximetry: 96 %             Physical Exam   Constitutional: She is oriented to person, place, and time. No distress.   Frail-appearing   HENT:   Head: Normocephalic and atraumatic.   Mouth/Throat: No oropharyngeal exudate.   Eyes: Conjunctivae are normal. Pupils are equal, round, and reactive to light. Right eye exhibits no discharge. Scleral icterus is present.   Neck: Neck supple. No JVD present. No thyromegaly present.   Cardiovascular: Normal rate and intact distal pulses.    No murmur heard.  Pulmonary/Chest: Effort normal and breath sounds normal. No stridor. No respiratory distress. She has no wheezes. She has no rales.   Abdominal: Soft. Bowel sounds are normal. She exhibits no distension. There is no tenderness. There is no rebound.   Musculoskeletal: Normal range of motion. She exhibits no edema.   Neurological: She is alert and oriented to person, place, and time. No cranial nerve deficit.   Skin: She is not diaphoretic. No erythema. There is pallor.   Generalized jaundice   Psychiatric: She has a normal mood and affect. Her behavior is normal. Thought content normal.         Assessment/Plan  * Pancreatic mass   Assessment & Plan    New pancreatic mass noted on a CT abdomen imaging last month by her primary care physician.  At this point patient unfortunately has poor appetite, generalized jaundice with scleral icterus.  Findings discussed with family as well as poor prognosis, in addition to given her age, she would be a poor candidate for any procedures to chemotherapy.   Although family would like GI to provide their opinion, they want her to be comfortable. After long discussion did agree that they would like hospice for her.  Continue supportive therapy  Hospice consulted        Type II diabetes mellitus (HCC)-  (present on admission)   Assessment & Plan    History of type II diabetes at home on insulin sliding scale,   Her  A1c 2 months ago was 14.2  At this point patient is 89 years old with cancer, poor appetite,   Continue Insulin-sliding scale, accu-checks and hypoglycemia protocol.  I do not recommend aggressive diabetes control or diet restrictions at this point.        Generalized weakness   Assessment & Plan    Most likely secondary to cancerous process with poor appetite  Dietary consulted  PT/OT          Hyponatremia- (present on admission)   Assessment & Plan    Most likely due to poor by mouth intake  Acute gouty fluids and recheck BMP in the morning        Essential hypertension- (present on admission)   Assessment & Plan    Control we will resume her home dose of losartan            I anticipate this patient will require at least two midnights for appropriate medical management, necessitating inpatient admission.    Prophylaxis: lovenox     I discussed advance care planning with the patient's family for at least 35 minutes, including diagnosis, prognosis, plan of care, risks and benefits of any therapies that could be offered, as well as alternatives including palliation and hospice, as appropriate.         Recent Labs      06/04/18   1003   WBC  6.6   RBC  3.49*   HEMOGLOBIN  11.6*   HEMATOCRIT  35.0*   MCV  100.3*   MCH  33.2*   MCHC  33.1*   RDW  74.2*   PLATELETCT  295   MPV  12.7     Recent Labs      06/04/18   1003   SODIUM  126*   POTASSIUM  4.2   CHLORIDE  98   CO2  17*   GLUCOSE  99   BUN  49*   CREATININE  1.02   CALCIUM  9.3     Recent Labs      06/04/18   1003   ALTSGPT  253*   ASTSGOT  360*   ALKPHOSPHAT  1219*   TBILIRUBIN  40.0*   LIPASE  24   GLUCOSE  99     Recent Labs      06/04/18   1003   INR  1.42*             Lab Results   Component Value Date    TROPONINI 0.09 (H) 06/04/2018       Imaging  No orders to display

## 2018-06-04 NOTE — ED NOTES
Med Rec complete per PT and Pt's family at bedside.  Allergies Reviewed  No ABX in the last 30 days

## 2018-06-04 NOTE — PROGRESS NOTES
1320 Pt arrived to room 303-1 via gurney, not able to walk due to weakness.    1430 Spoke to Zunilda SWEET, regarding Hospice.    1550 Pt ambulated to  w one person assistance, calls for assistance.    1750 FSBS 146    1905 Report given to NOC nurse, Chemo, at bedside.

## 2018-06-04 NOTE — DISCHARGE PLANNING
Received Choice form at 6950  Agency/Facility Name: [facility]  Referral sent to [facility] per Choice form @ [phone]  [place] of choice form faxed to [facility] at 3030.

## 2018-06-04 NOTE — DISCHARGE PLANNING
Order placed for Hospice.  KEE met with pt's family and [facility] was chosen.  The choice form was faxed to [facility].     The pt is [place] and the dtr stated that she does not speak much English.  KEE requested RN Clifton as he speaks Tagalog.  Per Blade with [facility], Clifton does not work until later on in the week.

## 2018-06-04 NOTE — ED NOTES
[place] fall assessment completed. Pt is high risk for fall. Interventions complete. Pt placed in yellow non slip socks, fall wrist band placed, green sign on door. Call light and belongings within reach, bed locked and in lowest position. Explained importance of calling before getting gout of bed and pt verbalizes understanding. Hourly rounding in place.

## 2018-06-04 NOTE — ED TRIAGE NOTES
Ansley Vazquez 89 y.o. female     Chief Complaint   Patient presents with   • Jaundice     5/22 CT shows dilated pancreatic duct.   • Sent by MD Dr. Singh   • Weakness     +decreased appetite      FSBS 127 PTA.  Alert and oriented, Daughter denies and mental status changes.  Chart placed for ERP eval.

## 2018-06-05 PROBLEM — R74.01 TRANSAMINITIS: Status: ACTIVE | Noted: 2018-01-01

## 2018-06-05 NOTE — PROGRESS NOTES
Bedside report given to Ludy TITUS. Plan of care discussed. Pt resting in bed with safety precautions in place.

## 2018-06-05 NOTE — DISCHARGE PLANNING
Received Choice form at [phone]  Agency/Facility Name: [facility]  Per Choice form @[id number]

## 2018-06-05 NOTE — PROGRESS NOTES
Hospital Medicine Daily Progress Note    Date of Service  6/5/2018    Chief Complaint  89 y.o. female history of hypertension, type II diabetes admitted 6/4/2018 with painless jaundice, found to have a pancreatic mass. She has also had generalized weakness for the last several weeks to months which has been worked up as an outpatient. GI was consulted and offered possible ERCP for biopsy however due to patient's advanced age, decision was made to pursue hospice rather than aggressive treatment.    Hospital Course   6/5: Discussed with hospice, looking into discharge options, family is 1st preference is group home. Patient denies any pain or short of breath. No nausea.    Interval Problem Update  No Nausea    Consultants/Specialty  Dr. Yu - GI  Hospice    Disposition  F/U DC options to group home    Review of Systems  Review of Systems   Constitutional: Negative for chills, fever and malaise/fatigue.   HENT: Negative for sore throat.    Respiratory: Negative for cough and shortness of breath.    Cardiovascular: Negative for chest pain and palpitations.   Gastrointestinal: Negative for abdominal pain, blood in stool, diarrhea, heartburn, nausea and vomiting.   Genitourinary: Negative for dysuria and frequency.   Musculoskeletal: Negative for back pain and myalgias.   Skin: Positive for itching (Well controlled).   Neurological: Negative for dizziness, focal weakness, weakness and headaches.   Psychiatric/Behavioral: Negative for depression and hallucinations.   All other systems reviewed and are negative.       Physical Exam  Blood Pressure : 116/57   Temperature: 36.6 °C (97.8 °F)   Pulse: 78   Respiration: 16   Pulse Oximetry: 97 %     Physical Exam   Constitutional: She is oriented to person, place, and time. She appears well-developed and well-nourished. No distress.   Elderly, jaundice   Eyes: Scleral icterus is present.   Neck: Normal range of motion. Neck supple. No JVD present. No thyromegaly present.    Cardiovascular: Regular rhythm.    No murmur heard.  Pulmonary/Chest: Effort normal and breath sounds normal. No respiratory distress. She has no wheezes.   Abdominal: Soft. Bowel sounds are normal. She exhibits no distension. There is no tenderness. There is no rebound.   Musculoskeletal: Normal range of motion. She exhibits no edema or tenderness.   Neurological: She is alert and oriented to person, place, and time. No cranial nerve deficit. Coordination normal.   Skin: Skin is warm and dry.   Psychiatric: She has a normal mood and affect. Her behavior is normal.   Non-english speaking       Fluids    Intake/Output Summary (Last 24 hours) at 06/05/18 1526  Last data filed at 06/05/18 1300   Gross per 24 hour   Intake              360 ml   Output              500 ml   Net             -140 ml       Laboratory  Recent Labs      06/04/18   1003  06/05/18   0431   WBC  6.6  5.5   RBC  3.49*  3.11*   HEMOGLOBIN  11.6*  10.1*   HEMATOCRIT  35.0*  31.0*   MCV  100.3*  99.7*   MCH  33.2*  32.5   MCHC  33.1*  32.6*   RDW  74.2*  74.5*   PLATELETCT  295  251   MPV  12.7  11.5     Recent Labs      06/04/18   1003  06/05/18   0431   SODIUM  126*  131*   POTASSIUM  4.2  3.7   CHLORIDE  98  108   CO2  17*  16*   GLUCOSE  99  149*   BUN  49*  39*   CREATININE  1.02  1.63*   CALCIUM  9.3  8.5     Recent Labs      06/04/18   1003   INR  1.42*               Imaging  Ct-abdomen With & W/o    Result Date: 5/23/2018    Likely obstructing 1.1 cm mass in the pancreatic head causing severe dilatation of the pancreatic duct, common bile duct and intrahepatic bile ducts. Further evaluation with ERCP is recommended. Distended gallbladder. Hiatal hernia.      Assessment/Plan  * Pancreatic mass   Assessment & Plan    New pancreatic mass noted on a CT abdomen imaging last month by her primary care physician.  She has had increasing poor appetite, generalized jaundice with scleral icterus.  Findings discussed with family as well as poor  prognosis, in addition to given her age, she would be a poor candidate for any procedures to chemotherapy.   GI has consulted and they offered ERCP for possible biopsy however the family declined, they would like her to be comfortable and to pursue hospice.   GI has signed off   Discussed the case with hospice, pending outpatient group home to be discharged with hospice care  The family is not discussing the word hospice or cancer with the patient        Transaminitis   Assessment & Plan    Due to obstruction from pancreatic mass.  No plan for intervention, no need to trend labs at this point        Type II diabetes mellitus (HCC)- (present on admission)   Assessment & Plan    History of type II diabetes at home on insulin sliding scale.  Her A1c 2 months ago was 14.2  Continue Insulin-sliding scale, accu-checks and hypoglycemia protocol.        Generalized weakness   Assessment & Plan    Most likely secondary to cancerous process, advanced age and poor appetite as well as physical debility.  Dietary is following  PT/OT recommending skilled nursing facility. Family would prefer a group home with hospice          Hyponatremia- (present on admission)   Assessment & Plan    Most likely due to poor by mouth intake  Recheck BMP in the morning        RUT (acute kidney injury) (HCC)- (present on admission)   Assessment & Plan    No plan for HD  Monitor UOP to ensure no obstruction        Essential hypertension- (present on admission)   Assessment & Plan    Controlled  Continue losartan

## 2018-06-05 NOTE — DISCHARGE PLANNING
Order placed for SNF.  Pt's dtr is agreeable to have a blanket referral placed.     Per ARIELA Stock with [facility], she is looking into  and can assist with funding.

## 2018-06-05 NOTE — DISCHARGE PLANNING
KEE LM for [facility] regarding when pt will be assessed.  KEE will f/u.    UPDATE:    KEE spoke to ARIELA Stock with hospice.  She will be arriving by 10:30am to meet with the pt and family.

## 2018-06-05 NOTE — PROGRESS NOTES
Bedside report received from Ludy TITUS. Plan of care discussed. Pt resting in bed with safety precautions in place.

## 2018-06-06 NOTE — PROGRESS NOTES
"1900 Received bed side report from ARIELA Boston, care plan discussed, assumed pt care.    1905 Pt eyes are closed, easy to arouse, verbalized fatigue, not in respiratory distress, jaundice in color, discussed care plan and due medications, patient said \"ayaw ko ng injection\" explained the importance of insulin check and heparin, patient agreed to insulin but still refused heparin. Ensure safety measure kept in place.    2009 Insulin coverage given, safety measure kept in place.       "

## 2018-06-06 NOTE — DISCHARGE PLANNING
Call placed to Jung with [facility], patient can be accepted for SNF stay with hospice.  Call placed to Idalia with [facility], patient has been denied for: No hospice bed at this time.  Call placed to Mirian with [facility] and patient has been denied for: no hospice bed.

## 2018-06-06 NOTE — HOSPICE
Spoke with patients daughter, Lucille.  She is meeting with a group home today at 1330 and then will go look at [facility].  Afterwards, she will call hospice to set up time to sign consents- triston Steen aware of plan. GENE patiño

## 2018-06-06 NOTE — DISCHARGE PLANNING
KEE met with pt's dtr, Reena.  She would like the pt to transition to [facility] instead of going to a .     KEE updated ARIELA Valenzuela with [facility].  She will come by tomorrow at 9am to see pt and get consents signed.      KEE will set up transport for pt tomorrow.

## 2018-06-06 NOTE — DISCHARGE PLANNING
Call placed to Rissa with [facility], confirmed patient can be accepted for SNF and understands patient will have [facility] to follow patient at SNF.       MANDO Mauro has been notified.

## 2018-06-06 NOTE — PROGRESS NOTES
[facility] Medicine Daily Progress Note    Date of Service  6/6/2018    Chief Complaint  89 y.o. female history of hypertension, type II diabetes admitted 6/4/2018 with painless jaundice, found to have a pancreatic mass. She has also had generalized weakness for the last several weeks to months which has been worked up as an outpatient. GI was consulted and offered possible ERCP for biopsy however due to patient's advanced age, decision was made to pursue hospice rather than aggressive treatment.    Hospital Course   6/5: Discussed with hospice, looking into discharge options, family is 1st preference is group home. Patient denies any pain or short of breath. No nausea.  6/6: Pending acceptance to group home, family's preference.  Has been accepted to SNF.  DC accucheck and IV    Interval Problem Update  No Nausea, denies pain or discomfort    Consultants/Specialty  Dr. Yu - GI  Hospice    Disposition  F/U DC options to group home    Review of Systems  Review of Systems   Constitutional: Negative for chills, fever and malaise/fatigue.   HENT: Negative for sore throat.    Respiratory: Negative for cough and shortness of breath.    Cardiovascular: Negative for chest pain and palpitations.   Gastrointestinal: Negative for abdominal pain, blood in stool, diarrhea, heartburn, nausea and vomiting.   Genitourinary: Negative for dysuria and frequency.   Musculoskeletal: Negative for back pain and myalgias.   Skin: Positive for itching (Well controlled).   Neurological: Negative for dizziness, focal weakness, weakness and headaches.   Psychiatric/Behavioral: Negative for depression and hallucinations.   All other systems reviewed and are negative.       Physical Exam  Blood Pressure : 116/57   Temperature: 36.6 °C (97.8 °F)   Pulse: 78   Respiration: 16   Pulse Oximetry: 97 %     Physical Exam   Constitutional: She is oriented to person, place, and time. She appears well-developed and well-nourished. No distress.   Elderly,  jaundice   Eyes: Scleral icterus is present.   Neck: Normal range of motion. Neck supple. No JVD present. No thyromegaly present.   Cardiovascular: Regular rhythm.    No murmur heard.  Pulmonary/Chest: Effort normal and breath sounds normal. No respiratory distress. She has no wheezes.   Abdominal: Soft. Bowel sounds are normal. She exhibits no distension. There is no tenderness. There is no rebound.   Musculoskeletal: Normal range of motion. She exhibits no edema or tenderness.   Neurological: She is alert and oriented to person, place, and time. No cranial nerve deficit. Coordination normal.   Skin: Skin is warm and dry.   Psychiatric: She has a normal mood and affect. Her behavior is normal.   Non-english speaking       Fluids    Intake/Output Summary (Last 24 hours) at 06/06/18 1259  Last data filed at 06/06/18 0909   Gross per 24 hour   Intake              480 ml   Output             1100 ml   Net             -620 ml       Laboratory  Recent Labs      06/04/18   1003  06/05/18   0431   WBC  6.6  5.5   RBC  3.49*  3.11*   HEMOGLOBIN  11.6*  10.1*   HEMATOCRIT  35.0*  31.0*   MCV  100.3*  99.7*   MCH  33.2*  32.5   MCHC  33.1*  32.6*   RDW  74.2*  74.5*   PLATELETCT  295  251   MPV  12.7  11.5     Recent Labs      06/04/18   1003  06/05/18   0431   SODIUM  126*  131*   POTASSIUM  4.2  3.7   CHLORIDE  98  108   CO2  17*  16*   GLUCOSE  99  149*   BUN  49*  39*   CREATININE  1.02  1.63*   CALCIUM  9.3  8.5     Recent Labs      06/04/18   1003   INR  1.42*               Imaging  Ct-abdomen With & W/o    Result Date: 5/23/2018    Likely obstructing 1.1 cm mass in the pancreatic head causing severe dilatation of the pancreatic duct, common bile duct and intrahepatic bile ducts. Further evaluation with ERCP is recommended. Distended gallbladder. Hiatal hernia.      Assessment/Plan  * Pancreatic mass   Assessment & Plan    New pancreatic mass noted on a CT abdomen imaging last month by her primary care physician.  She  has had increasing poor appetite, generalized jaundice with scleral icterus.  Findings discussed with family as well as poor prognosis, in addition to given her age, she would be a poor candidate for any procedures to chemotherapy.   GI has consulted and they offered ERCP for possible biopsy however the family declined, they would like her to be comfortable and to pursue hospice.   GI has signed off   Discussed the case with hospice, pending outpatient group home to be discharged with hospice care  The family is not discussing the word hospice or cancer with the patient        Transaminitis   Assessment & Plan    Due to obstruction from pancreatic mass.  No plan for intervention, no need to trend labs at this point        Type II diabetes mellitus (HCC)- (present on admission)   Assessment & Plan    History of type II diabetes at home on insulin sliding scale.  Her A1c 2 months ago was 14.2  Continue Insulin-sliding scale, accu-checks and hypoglycemia protocol.        Generalized weakness   Assessment & Plan    Most likely secondary to cancerous process, advanced age and poor appetite as well as physical debility.  Dietary is following  PT/OT recommending skilled nursing facility. Family would prefer a group home with hospice          Hyponatremia- (present on admission)   Assessment & Plan    Most likely due to poor by mouth intake  Recheck BMP in the morning        RUT (acute kidney injury) (HCC)- (present on admission)   Assessment & Plan    No plan for HD  Monitor UOP to ensure no obstruction        Essential hypertension- (present on admission)   Assessment & Plan    Controlled  Continue losartan

## 2018-06-06 NOTE — DISCHARGE PLANNING
Care Transition Team Assessment    SW met with pt's dtr, Reena.  She stated that the pt resides with her.  Reena stated that she has minimal family support here locally.  [place] how long she will remain here.  Per RN Dayami with hospice, she has found a GH and will update Reena.  Reena will need to see the GH.  SW will have [facility] f/u on the SNF referrals.    Information Source  Information Given By: Relative  Informant's Name: Ez Leone  Who is responsible for making decisions for patient? : Patient    Readmission Evaluation  Is this a readmission?: No    Elopement Risk  Legal Hold: No  Ambulatory or Self Mobile in Wheelchair: No-Not an Elopement Risk  Elopement Risk: Not at Risk for Elopement    Interdisciplinary Discharge Planning  Does Admitting Nurse Feel This Could be a Complex Discharge?: No  Primary Care Physician: Dr. Singh  Lives with - Patient's Self Care Capacity: Adult Children  Support Systems: Children  Housing / Facility: [address]  Do You Take your Prescribed Medications Regularly: Yes  Able to Return to Previous ADL's: Other (weakness present)  Mobility Issues: Yes (weakness)  Prior Services: Continuous (24 Hour) Care Giving Family  Patient Expects to be Discharged to:: home  Assistance Needed: Yes  Durable Medical Equipment: Not Applicable    Discharge Preparedness  What is your plan after discharge?: Skilled nursing facility, Group home  What are your discharge supports?: Child, Other (comment)  Prior Functional Level: Ambulatory, Uses [facility]    Functional Assesment  Prior Functional Level: Ambulatory, Uses [facility]    Finances  Financial Barriers to Discharge: No  Prescription Coverage: Yes    Vision / Hearing Impairment  Vision Impairment : No  Hearing Impairment : No    Values / Beliefs / Concerns  Values / Beliefs Concerns : No    Domestic Abuse  Have you ever been the victim of abuse or violence?: No    Discharge  Risks or Barriers  Discharge risks or barriers?: No    Anticipated Discharge Information  Anticipated discharge disposition: Group home, SNF

## 2018-06-06 NOTE — PROGRESS NOTES
Received report from Essie TITUS. Discussed plan of care and safety measures in place. No further needs at this time.

## 2018-06-06 NOTE — HOSPICE
Late entry note: Time of discussion occurred at 11:45am. Coordination of care with Dr. Hawkins, and  Zunilda. Both updated on outcome of hospice discussion. Patient is hospice appropriate, family agrees to hospice care, state they are unable to care for patient at home. Long discussion regarding alternate living situation. All in agreement to send referrals to SNF facilities for availability of Medicaid bed. Family also interested in group home options. Dr. Hawkins will order [facility] in preparation for discharge, Zunilda will follow up on SNF beds.

## 2018-06-06 NOTE — PROGRESS NOTES
Pt is A&Ox4, resting comfortably in bed. Assessment completed and denies any pain. Due medication have been given. Pt is up for meal, bed is in lowest postion, bed alarm is on, and side rails up x2, pt calls when needs assistance and call light within reach.

## 2018-06-06 NOTE — PROGRESS NOTES
0700 Report received from [facility]Chemo, at bedside. Pt is resting in bed & eyes closed, pt's daughter is at bedside.    0830 Pt is alert & oriented x 4, respirations are regular and unlabored. Jaundice noticed otherwise skin intact, IV patent & infusing IVF. Pt gets up with one person assistance to BSC. Bed in low position, call light within reach.    1100 Hospice nurse is pt's family.    1133 FSBS 167, refuses insulin.    1800 FSBS 178, pt had snack that family brought before accu-check. Refused Insulin.    1900 Report given to [facility] nurseYvette, at bedside.

## 2018-06-07 NOTE — DISCHARGE PLANNING
KEE received a call from ARIELA Valenzuela with [facility] stating that the appointment will need to be rescheduled from 11-12 today.      KEE updated pt's dtr, Reena.  Reena will tour both [facility] and [facility] today.      KEE updated ARIELA Dietz.

## 2018-06-07 NOTE — PROGRESS NOTES
Received report from Niraj TITUS. Discussed plan of care and safety measures in place. No further needs at this time.

## 2018-06-07 NOTE — PROGRESS NOTES
Completed assessment and administered scheduled medications. Bed in low position, locked, and appropriate alarms set. Personal belongings and call light are within reach. Patient has no additional needs at this time. Family at bedside.

## 2018-06-07 NOTE — PROGRESS NOTES
Pt is resting in bed with family. Pt is dressed and ready to go. Family and patient are still waiting for [facility].

## 2018-06-07 NOTE — PROGRESS NOTES
Received bedside report from ARIELA Dietz. Plan of care discussed. Safety precautions in place. Call light and personal belongings within reach. Patient has no needs at this time. Family at bedside.

## 2018-06-07 NOTE — TELEPHONE ENCOUNTER
PVP WITH OUTREACH  Future Appointments       Provider Department Center    6/11/2018 9:20 AM Vy Singh M.D. [facility]     6/21/2018 10:20 AM Vy Singh M.D.; [facility]  [facility]           ANNUAL WELLNESS VISIT PRE-VISIT PLANNING     1.  Immunizations were updated in Epic using WebIZ?: Yes       •  WebIZ Recommendations: TDAP and SHINGRIX (Shingles) Pt has postponed vaccinations       •  Is patient due for Tdap? NO       •  Is patient due for Shingles? NO     2.  Specialty Comments was updated with diagnosis information provided by [facility]: NO

## 2018-06-07 NOTE — DISCHARGE PLANNING
Transport set up for pt at 1600 per [facility].      [phone]

## 2018-06-07 NOTE — PROGRESS NOTES
Pt is A&Ox4, resting comfortably in bed. Assessment completed and denies any pain at this time. Hand swollen and heat pack given.  Due medication have been given. Pt is up for meal, bed is in lowest postion, bed alarm is on, and side rails up x2, pt calls when needs assistance and call light within reach.

## 2018-06-07 NOTE — PROGRESS NOTES
Gave bedside report to ARIELA Anand and ARIELA Dietz. Plan of care discussed. Safety precautions in place. Personal belongings and call light are with in reach. Patient has no additional needs at this time.

## 2018-06-07 NOTE — DISCHARGE SUMMARY
"Discharge Summary    CHIEF COMPLAINT ON ADMISSION  Chief Complaint   Patient presents with   • Jaundice     5/22 CT shows dilated pancreatic duct.   • Sent by MD Dr. Singh   • Weakness     +decreased appetite       Reason for Admission  weakness no appetite     CODE STATUS  DNR    HPI & HOSPITAL COURSE  This is a 89 y.o. female history of hypertension, type II diabetes admitted 6/4/2018 with painless jaundice, found to have a pancreatic mass. She has also had generalized weakness for the last several weeks to months which has been worked up as an outpatient. GI was consulted and offered possible ERCP for biopsy however due to patient's advanced age, decision was made to pursue hospice rather than aggressive treatment.  The patient was not experiencing any acute pain or nausea. There are multiple discussions held with both the family and palliative care regarding hospice discharge and also placement. Ultimately, the decision was made to discharge the patient to skilled nursing facility and she was accepted at [facility].  She was started on Marinol to help with her appetite, and was not requiring any narcotic medications.    Of note she is not English speaking therefore, her family was present frequently for translation. They have also not discussed the word \"hospice\" with the patient, however are rather talking with her that the goals of her treatment will be to keep her comfortable. Please confirm with family prior to initiating hospice discussions with the patient.    Therefore, she is discharged in fair and stable condition to skilled nursing facility.    The patient met 2-midnight criteria for an inpatient stay at the time of discharge.      FOLLOW UP ITEMS POST DISCHARGE  F/U with out patient hospice for ongoing medications and adjustments    DISCHARGE DIAGNOSES  Principal Problem:    Pancreatic mass POA: Unknown  Active Problems:    Essential hypertension POA: Yes    RUT (acute kidney injury) (HCC) POA: " "Yes    Hyponatremia POA: Yes    Generalized weakness POA: Unknown    Type II diabetes mellitus (HCC) POA: Yes    Transaminitis POA: Unknown  Resolved Problems:    * No resolved hospital problems. *      FOLLOW UP  Future Appointments  Date Time Provider Department Center   6/11/2018 9:20 AM Vy Singh M.D. [facility] None   6/21/2018 10:20 AM Vy Singh M.D. [facility] None     No follow-up provider specified.    MEDICATIONS ON DISCHARGE     Medication List      START taking these medications      Instructions   dronabinol 5 MG Caps  Commonly known as:  MARINOL   Take 1 Cap by mouth 2 times a day for 7 days.  Dose:  5 mg        CONTINUE taking these medications      Instructions   losartan 50 MG Tabs  Commonly known as:  COZAAR   Take 50 mg by mouth every day.  Dose:  50 mg     MILK OF MAGNESIA 400 MG/5ML Susp  Generic drug:  magnesium hydroxide   Take 30 mL by mouth 1 time daily as needed (constipation).  Dose:  30 mL     RESTASIS 0.05 % ophthalmic emulsion  Generic drug:  cyclosporin   Doctor's comments:  For Dry eye  INSTILL 1 DROP INTO BOTH EYES TWICE A DAY     vitamin D3 5000 units Caps   Take 1 Cap by mouth every day.  Dose:  1 Cap     vitamin E 1000 UNIT Caps  Commonly known as:  VITAMIN E   Take 1 Cap by mouth every day.  Dose:  1 Cap        STOP taking these medications    glycerin (adult) 2 GM suppository     insulin detemir 100 UNIT/ML Soln  Commonly known as:  LEVEMIR     MULTI VITAMIN DAILY PO     SALONPAS PAIN RELIEF PATCH EX            Allergies  Allergies   Allergen Reactions   • Aspirin Unspecified     \"Bloody stools\"   • Penicillins Hives     \"All over body\"   • Voltaren [Diclofenac] Unspecified     \"Bloody stools\"       DIET  Orders Placed This Encounter   Procedures   • Diet Order     Standing Status:   Standing     Number of Occurrences:   1     Order Specific Question:   Diet:     Answer:   Regular [1]       ACTIVITY  As tolerated.  Weight bearing as tolerated    LINES, DRAINS, AND " WOUNDS  Peripheral IVs were be removed prior to discharge.    MENTAL STATUS ON TRANSFER  Somnolent, but not confused  Non-english speaking    CONSULTATIONS  Dr. Yu - GI    PROCEDURES  None    LABORATORY  Lab Results   Component Value Date    SODIUM 131 (L) 06/05/2018    POTASSIUM 3.7 06/05/2018    CHLORIDE 108 06/05/2018    CO2 16 (L) 06/05/2018    GLUCOSE 149 (H) 06/05/2018    BUN 39 (H) 06/05/2018    CREATININE 1.63 (H) 06/05/2018        Lab Results   Component Value Date    WBC 5.5 06/05/2018    HEMOGLOBIN 10.1 (L) 06/05/2018    HEMATOCRIT 31.0 (L) 06/05/2018    PLATELETCT 251 06/05/2018        Total time of the discharge process exceeds 40 minutes.

## 2018-06-07 NOTE — PROGRESS NOTES
Gave report to BRENTON Downing and showed him the prescription. Discharge instructions completed and personal belongs with patient. Taken by kermit.

## 2018-06-07 NOTE — DISCHARGE PLANNING
Transportation form received for patient to transfer via [facility] to [facility]. Information sent to [facility], called place to belia at [facility].  Set up transport for 1600. MANDO Mauro has been notified.

## 2018-06-07 NOTE — DISCHARGE INSTRUCTIONS
Discharge Instructions    Discharged to other by medical transportation with self. Discharged via wheelchair, hospital escort: Yes.  Special equipment needed: Not Applicable    Be sure to schedule a follow-up appointment with your primary care doctor or any specialists as instructed.     Discharge Plan:   Diet Plan: Discussed  Activity Level: Discussed  Confirmed Follow up Appointment: Appointment Scheduled  Confirmed Symptoms Management: Discussed  Medication Reconciliation Updated: Yes  Influenza Vaccine Indication: Patient Refuses    I understand that a diet low in cholesterol, fat, and sodium is recommended for good health. Unless I have been given specific instructions below for another diet, I accept this instruction as my diet prescription.   Other diet: Regular    Special Instructions: None    · Is patient discharged on Warfarin / Coumadin?   No       Dronabinol, THC capsules  What is this medicine?  DRONABINOL (droe NAB i nol) is used to treat nausea and vomiting caused by cancer treatment, especially for those patients who do not respond to other medicines. This medicine is also used to increase appetite in AIDS patients.  This medicine may be used for other purposes; ask your health care provider or pharmacist if you have questions.  COMMON BRAND NAME(S): Marinol  What should I tell my health care provider before I take this medicine?  They need to know if you have any of these conditions:  -a history of drug or alcohol abuse  -heart disease, including angina or irregular heart rate  -high or low blood pressure  -dizziness or fainting spells on standing  -mental health problems (schizophrenia, regina, depression)  -an unusual or allergic reaction to dronabinol, marijuana, sesame oil, other medicines, foods, dyes, or preservatives  -pregnant or trying to get pregnant  -breast-feeding  How should I use this medicine?  Take this medicine by mouth. Follow the directions on the prescription label. Take your  doses at regular intervals. Do not take your medicine more often than directed.  Talk to your pediatrician regarding the use of this medicine in children. Special care may be needed.  Overdosage: If you think you have taken too much of this medicine contact a poison control center or emergency room at once.  NOTE: This medicine is only for you. Do not share this medicine with others.  What if I miss a dose?  If you miss a dose, take it as soon as you can. If it is almost time for your next dose, take only that dose. Do not take double or extra doses.  What may interact with this medicine?  Do not take this medicine with any of the following medications:  -nabilone  This medicine may also interact with the following medications:  -alcohol-containing medicines or drinks  -amphetamine or other stimulant drugs  -barbiturates such as phenobarbital  -certain antidepressants or tranquilizers  -certain antihistamines used in cold medicines  -cocaine  -disulfiram  -muscle relaxants  -theophylline  This list may not describe all possible interactions. Give your health care provider a list of all the medicines, herbs, non-prescription drugs, or dietary supplements you use. Also tell them if you smoke, drink alcohol, or use illegal drugs. Some items may interact with your medicine.  What should I watch for while using this medicine?  The first time you take this medicine or have an increase in dose make sure there is a responsible person nearby. You may experience mood changes, easy laughter, or other changes in behavior.  You may get drowsy or dizzy. Do not drive, use machinery, or do anything that needs mental alertness until you know how this medicine affects you. Do not stand or sit up quickly, especially if you have low blood pressure or if you are an older patient. This reduces the risk of dizzy or fainting spells. Alcohol may interfere with the effect of this medicine. Avoid alcoholic drinks.  If you are taking this  medicine to improve your appetite, this is only part of your therapy. Discuss what you can eat and ways of improving your diet with your health care professional or nutritionist. Frequent small snacks as well as regular meals can help provide extra calories and protein.  Do not smoke marijuana while you are taking this medicine. It is similar to one of the active substances found in marijuana. You are at increased risk of serious heart and/or nervous system side effects if these drugs are used together.  What side effects may I notice from receiving this medicine?  Side effects that you should report to your doctor or health care professional as soon as possible:  -allergic reactions like skin rash, itching or hives, swelling of the face, lips, or tongue  -fast, irregular heartbeat  -feeling faint or lightheaded, falls  -hallucinations  -panic reactions  -slurred speech  Side effects that usually do not require medical attention (report to your doctor or health care professional if they continue or are bothersome):  -anxiety or nervousness  -confusion  -nausea, vomiting or diarrhea  -stomach upset  This list may not describe all possible side effects. Call your doctor for medical advice about side effects. You may report side effects to FDA at 8-269-FDA-9306.  Where should I keep my medicine?  This medicine may cause accidental overdose and death if taken by other adults, children, or pets. Keep out of the reach of children. This medicine can be abused. Keep your medicine in a safe place to protect it from theft. Do not share this medicine with anyone. Selling or giving away this medicine is dangerous and against the law.  Store in a cool place between 8 and 15 degrees C (46 and 59 degrees F) or in a refrigerator. Avoid freezing. Mix any unused medicine with a substance like cat litter or coffee grounds. Then throw the medicine away in a sealed container like a sealed bag or a coffee can with a lid. Do not use the  medicine after the expiration date.  NOTE: This sheet is a summary. It may not cover all possible information. If you have questions about this medicine, talk to your doctor, pharmacist, or health care provider.  © 2018 Elsevier/Gold Standard (2016-08-31 10:11:50)    Jaundice, Adult  Introduction  Jaundice is when the skin, the whites of the eyes, and mucous membranes turn a yellowish color. It is caused by a high level of bilirubin in the blood. Bilirubin is made in the body when red blood cells break down normally. Having jaundice may mean that your liver or your body's bile system is not working right.  Follow these instructions at home:  · Drink enough fluid to keep your pee (urine) clear or pale yellow.  · Do not drink alcohol.  · Take medicines only as told by your doctor.  · Keep all follow-up visits as told by your doctor. This is important.  · You can use skin lotion to help with itching.  Contact a doctor if:  · You have a fever.  Get help right away if:  · Your symptoms suddenly get worse.  · You have symptoms for more than 72 hours.  · Your pain gets worse.  · You keep throwing up (vomiting).  · You become weak or confused.  · You get a very bad headache.  · You lose too much body fluid (dehydration). Signs that have you lost too much body fluid include:  ¨ A very dry mouth.  ¨ A fast, weak pulse.  ¨ Fast breathing.  ¨ Blue lips.  This information is not intended to replace advice given to you by your health care provider. Make sure you discuss any questions you have with your health care provider.  Document Released: 01/20/2012 Document Revised: 05/25/2017 Document Reviewed: 12/14/2015  © 2017 Elsevier      Depression / Suicide Risk    As you are discharged from this [facility] facility, it is important to learn how to keep safe from harming yourself.    Recognize the warning signs:  · Abrupt changes in personality, positive or negative- including increase in energy   · Giving away  possessions  · Change in eating patterns- significant weight changes-  positive or negative  · Change in sleeping patterns- unable to sleep or sleeping all the time   · Unwillingness or inability to communicate  · Depression  · Unusual sadness, discouragement and loneliness  · Talk of wanting to die  · Neglect of personal appearance   · Rebelliousness- reckless behavior  · Withdrawal from people/activities they love  · Confusion- inability to concentrate     If you or a loved one observes any of these behaviors or has concerns about self-harm, here's what you can do:  · Talk about it- your feelings and reasons for harming yourself  · Remove any means that you might use to hurt yourself (examples: pills, rope, extension cords, firearm)  · Get professional help from the community (Mental Health, Substance Abuse, psychological counseling)  · Do not be alone:Call your Safe Contact- someone whom you trust who will be there for you.  · Call your local CRISIS HOTLINE 491-2438 or 610-739-4605  · Call your local Children's Mobile Crisis Response Team [place] (808) 611-7171 or www.[employer]  · Call the toll free National Suicide Prevention Hotlines   · National Suicide Prevention Lifeline 781-307-YHYB (2584)  · National Hope Line Network 800-SUICIDE (380-5294)

## 2018-06-11 NOTE — TELEPHONE ENCOUNTER
1. Caller Name: Daughter - Reena                               Call Back Number:  [phone]      Patient approves a detailed voicemail message: yes    I spoke with patient's daughter Reena and she is requesting a letter for patient stating her medical condition that she can not fly to the [facility].  I asked why the letter was needed and whom the letter would be provided to however this was not clear and patient states you were aware of what was needed.  Routing to PCP.

## 2018-06-12 PROCEDURE — 665998 HH PPS REVENUE CREDIT

## 2018-06-12 PROCEDURE — 665999 HH PPS REVENUE DEBIT

## 2018-06-12 NOTE — TELEPHONE ENCOUNTER
Spoke with patient's daughter. I offered my condolences. Patient was transferred to nursing home after hospitalization on 6/8/18 and she passed away this afternoon. She was under hospice. Daughter will have patient cremated and she will take ashes to the [facility] as per patient's wishes so she can be buried next to her . I told the daughter to let me know if there is anything we need to do for her. Daughter has relatives from other state who are with her and helping her.

## 2018-06-13 PROCEDURE — 665998 HH PPS REVENUE CREDIT

## 2018-06-13 PROCEDURE — 665999 HH PPS REVENUE DEBIT

## 2018-06-14 PROCEDURE — 665999 HH PPS REVENUE DEBIT

## 2018-06-14 PROCEDURE — 665998 HH PPS REVENUE CREDIT

## 2018-06-15 PROCEDURE — 665999 HH PPS REVENUE DEBIT

## 2018-06-15 PROCEDURE — 665998 HH PPS REVENUE CREDIT

## 2018-06-15 NOTE — ADDENDUM NOTE
Encounter addended by: Pretty Jackson R.N. on: 6/15/2018  7:52 AM<BR>    Actions taken: Flowsheet accepted

## 2018-06-16 PROCEDURE — 665999 HH PPS REVENUE DEBIT

## 2018-06-16 PROCEDURE — 665998 HH PPS REVENUE CREDIT

## 2018-06-17 PROCEDURE — 665998 HH PPS REVENUE CREDIT

## 2018-06-17 PROCEDURE — 665999 HH PPS REVENUE DEBIT

## 2018-06-18 PROCEDURE — 665998 HH PPS REVENUE CREDIT

## 2018-06-18 PROCEDURE — 665999 HH PPS REVENUE DEBIT

## 2018-06-19 PROCEDURE — 665999 HH PPS REVENUE DEBIT

## 2018-06-19 PROCEDURE — 665998 HH PPS REVENUE CREDIT

## 2018-06-20 PROCEDURE — 665999 HH PPS REVENUE DEBIT

## 2018-06-20 PROCEDURE — 665998 HH PPS REVENUE CREDIT

## 2018-06-21 PROCEDURE — 665998 HH PPS REVENUE CREDIT

## 2018-06-21 PROCEDURE — 665999 HH PPS REVENUE DEBIT

## 2023-01-11 NOTE — MR AVS SNAPSHOT
"        Ansley Vazquez   2017 3:20 PM   Office Visit   MRN: [id number]    Department:  [facility]   Dept Phone:  [phone]    Description:  Female : 3/11/1920   Provider:  Vy Singh M.D.           Reason for Visit     Establish Care new pt     Other L leg feels heavy      Allergies as of 2017     Allergen Noted Reactions    Aspirin 2017   Unspecified    Bloody stools    Penicillins 2017   Hives    Voltaren [Diclofenac] 2017         You were diagnosed with     Essential hypertension   [8882903]       Numbness in both hands   [[id number]]         Vital Signs     Blood Pressure Pulse Temperature Respirations Height Weight    122/68 mmHg 96 37 °C (98.6 °F) 16 1.6 m (5' 3\") 55.339 kg (122 lb)    Body Mass Index Oxygen Saturation Smoking Status             21.62 kg/m2 93% Never Smoker          Basic Information     Date Of Birth Sex Race Ethnicity Preferred Language    3/11/1920 Female Other Non- English      Your appointments     2018  4:00 PM   Established Patient with Vy Singh M.D.   [facility] (--)    [address]  [address]  [facility] [address] [phone]           You will be receiving a confirmation call a few days before your appointment from our automated call confirmation system.              Health Maintenance        Date Due Completion Dates    IMM DTaP/Tdap/Td Vaccine (1 - Tdap) 3/11/1939 ---    PAP SMEAR 3/11/1941 ---    MAMMOGRAM 3/11/1960 ---    COLONOSCOPY 3/11/1970 ---    IMM ZOSTER VACCINE 3/11/1980 ---    IMM PNEUMOCOCCAL 65+ (ADULT) LOW/MEDIUM RISK SERIES (1 of 2 - PCV13) 3/11/1985 ---    BONE DENSITY 2016    IMM INFLUENZA (1) 2017 ---            Current Immunizations     No immunizations on file.      Below and/or attached are the medications your provider expects you to take. Review all of your home medications and newly ordered medications with your provider and/or pharmacist. Follow " medication instructions as directed by your provider and/or pharmacist. Please keep your medication list with you and share with your provider. Update the information when medications are discontinued, doses are changed, or new medications (including over-the-counter products) are added; and carry medication information at all times in the event of emergency situations     Allergies:  ASPIRIN - Unspecified     PENICILLINS - Hives     VOLTAREN - (reactions not documented)               Medications  Valid as of: July 24, 2017 -  4:12 PM    Generic Name Brand Name Tablet Size Instructions for use    Azithromycin (Tab) ZITHROMAX 250 MG Take as directed        Benzonatate (Cap) TESSALON 100 MG Take 1 Cap by mouth 3 times a day as needed for Cough.        Calcium Citrate-Vitamin D   Take  by mouth.        Cholecalciferol (Tab) cholecalciferol 1000 UNIT Take 1,000 Units by mouth every day.        CycloSPORINE (Emulsion) RESTASIS 0.05 % INSTILL 1 DROP INTO BOTH EYES TWICE A DAY        Losartan Potassium-HCTZ (Tab) HYZAAR 50-12.5 MG Take 1 Tab by mouth every day.        Multiple Vitamin   Take  by mouth.        Vitamin E (Cap) VITAMIN E 1000 UNIT Take 1 Cap by mouth every day.        .                 Medicines prescribed today were sent to:     [id number] - [place], [place] - [address] [place] [address] AT [address] [address]    Phone: [phone] Fax: [phone]    Open 24 Hours?: No      Medication refill instructions:       If your prescription bottle indicates you have medication refills left, it is not necessary to call your provider’s office. Please contact your pharmacy and they will refill your medication.    If your prescription bottle indicates you do not have any refills left, you may request refills at any time through one of the following ways: The online [employer] system (except Urgent Care), by calling your provider’s office, or by asking your pharmacy to contact your  provider’s office with a refill request. Medication refills are processed only during regular business hours and may not be available until the next business day. Your provider may request additional information or to have a follow-up visit with you prior to refilling your medication.   *Please Note: Medication refills are assigned a new Rx number when refilled electronically. Your pharmacy may indicate that no refills were authorized even though a new prescription for the same medication is available at the pharmacy. Please request the medicine by name with the pharmacy before contacting your provider for a refill.           [employer] Access Code: Activation code not generated  Current QPD Status: Active           Pt received semisupine in bed, +IV, in NAD. Pt agreeable to participate in PT evaluation. Pt left semisupine in bed, all lines intact and RN aware.

## 2024-02-21 NOTE — ASSESSMENT & PLAN NOTE
Controlled  Continue losartan  
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  No plan for intervention, no need to trend labs at this point  
History of type II diabetes at home on insulin sliding scale.  Her A1c 2 months ago was 14.2  Continue Insulin-sliding scale, accu-checks and hypoglycemia protocol.  
Most likely due to poor by mouth intake  Recheck BMP in the morning  
Most likely secondary to cancerous process, advanced age and poor appetite as well as physical debility.  Dietary is following  PT/OT recommending skilled nursing facility. Family would prefer a group home with hospice    
New pancreatic mass noted on a CT abdomen imaging last month by her primary care physician.  She has had increasing poor appetite, generalized jaundice with scleral icterus.  Findings discussed with family as well as poor prognosis, in addition to given her age, she would be a poor candidate for any procedures to chemotherapy.   GI has consulted and they offered ERCP for possible biopsy however the family declined, they would like her to be comfortable and to pursue hospice.   GI has signed off   Discussed the case with hospice, pending outpatient group home to be discharged with hospice care  The family is not discussing the word hospice or cancer with the patient  
No plan for HD  Monitor UOP to ensure no obstruction  
Her/She